# Patient Record
Sex: FEMALE | Race: WHITE | Employment: FULL TIME | URBAN - NONMETROPOLITAN AREA
[De-identification: names, ages, dates, MRNs, and addresses within clinical notes are randomized per-mention and may not be internally consistent; named-entity substitution may affect disease eponyms.]

---

## 2017-02-06 ENCOUNTER — OFFICE VISIT (OUTPATIENT)
Dept: FAMILY MEDICINE CLINIC | Age: 57
End: 2017-02-06
Payer: COMMERCIAL

## 2017-02-06 VITALS
TEMPERATURE: 98.2 F | OXYGEN SATURATION: 98 % | WEIGHT: 130 LBS | RESPIRATION RATE: 16 BRPM | SYSTOLIC BLOOD PRESSURE: 132 MMHG | BODY MASS INDEX: 30.09 KG/M2 | DIASTOLIC BLOOD PRESSURE: 74 MMHG | HEART RATE: 156 BPM | HEIGHT: 55 IN

## 2017-02-06 DIAGNOSIS — Z78.0 MENOPAUSE: ICD-10-CM

## 2017-02-06 DIAGNOSIS — Z12.31 SCREENING MAMMOGRAM, ENCOUNTER FOR: ICD-10-CM

## 2017-02-06 DIAGNOSIS — E55.9 VITAMIN D INSUFFICIENCY: ICD-10-CM

## 2017-02-06 DIAGNOSIS — Z00.00 ANNUAL PHYSICAL EXAM: Primary | ICD-10-CM

## 2017-02-06 DIAGNOSIS — K21.9 GASTROESOPHAGEAL REFLUX DISEASE WITHOUT ESOPHAGITIS: ICD-10-CM

## 2017-02-06 DIAGNOSIS — Z13.9 SCREENING: ICD-10-CM

## 2017-02-06 PROCEDURE — 99396 PREV VISIT EST AGE 40-64: CPT | Performed by: NURSE PRACTITIONER

## 2017-02-06 RX ORDER — OMEPRAZOLE 20 MG/1
20 CAPSULE, DELAYED RELEASE ORAL EVERY MORNING
Qty: 90 CAPSULE | Refills: 3 | Status: SHIPPED | OUTPATIENT
Start: 2017-02-06 | End: 2018-04-23 | Stop reason: SDUPTHER

## 2017-02-06 ASSESSMENT — ENCOUNTER SYMPTOMS
CHANGE IN BOWEL HABIT: 0
SHORTNESS OF BREATH: 0

## 2017-02-16 ENCOUNTER — OFFICE VISIT (OUTPATIENT)
Dept: FAMILY MEDICINE CLINIC | Age: 57
End: 2017-02-16
Payer: COMMERCIAL

## 2017-02-16 VITALS
HEIGHT: 55 IN | HEART RATE: 96 BPM | WEIGHT: 132 LBS | DIASTOLIC BLOOD PRESSURE: 88 MMHG | TEMPERATURE: 99.3 F | SYSTOLIC BLOOD PRESSURE: 134 MMHG | BODY MASS INDEX: 30.55 KG/M2 | OXYGEN SATURATION: 97 %

## 2017-02-16 DIAGNOSIS — J02.9 SORE THROAT: ICD-10-CM

## 2017-02-16 DIAGNOSIS — R52 BODY ACHES: ICD-10-CM

## 2017-02-16 DIAGNOSIS — R50.9 FEVER, UNSPECIFIED FEVER CAUSE: ICD-10-CM

## 2017-02-16 DIAGNOSIS — J10.1 TYPE A INFLUENZA: Primary | ICD-10-CM

## 2017-02-16 LAB
RAPID INFLUENZA  B AGN: NEGATIVE
RAPID INFLUENZA A AGN: POSITIVE
S PYO AG THROAT QL: NEGATIVE

## 2017-02-16 PROCEDURE — 99213 OFFICE O/P EST LOW 20 MIN: CPT | Performed by: NURSE PRACTITIONER

## 2017-02-16 RX ORDER — OSELTAMIVIR PHOSPHATE 75 MG/1
75 CAPSULE ORAL 2 TIMES DAILY
Qty: 10 CAPSULE | Refills: 0 | Status: SHIPPED | OUTPATIENT
Start: 2017-02-16 | End: 2017-02-21

## 2017-02-16 RX ORDER — CALCIUM CARBONATE 500(1250)
500 TABLET ORAL DAILY
COMMUNITY

## 2017-02-16 RX ORDER — GUAIFENESIN AND CODEINE PHOSPHATE 100; 10 MG/5ML; MG/5ML
5 SOLUTION ORAL 4 TIMES DAILY PRN
Qty: 160 ML | Refills: 0 | Status: SHIPPED | OUTPATIENT
Start: 2017-02-16 | End: 2017-02-23

## 2017-02-16 ASSESSMENT — ENCOUNTER SYMPTOMS
COUGH: 1
SORE THROAT: 1
SINUS PRESSURE: 1
SINUS COMPLAINT: 1

## 2017-02-18 LAB — S PYO THROAT QL CULT: NORMAL

## 2017-02-23 DIAGNOSIS — R80.9 PROTEINURIA: ICD-10-CM

## 2017-02-23 DIAGNOSIS — R82.998 CALCIUM OXALATE CRYSTALS IN URINE: ICD-10-CM

## 2017-02-23 DIAGNOSIS — R74.8 ELEVATED LIVER ENZYMES: Primary | ICD-10-CM

## 2017-02-23 DIAGNOSIS — E55.9 VITAMIN D INSUFFICIENCY: ICD-10-CM

## 2017-02-23 DIAGNOSIS — Z00.00 ANNUAL PHYSICAL EXAM: ICD-10-CM

## 2017-02-23 LAB
ALBUMIN SERPL-MCNC: 4.9 G/DL (ref 3.5–5.2)
ALP BLD-CCNC: 76 U/L (ref 35–104)
ALT SERPL-CCNC: 52 U/L (ref 5–33)
AMORPHOUS: ABNORMAL /HPF
ANION GAP SERPL CALCULATED.3IONS-SCNC: 18 MMOL/L (ref 7–19)
AST SERPL-CCNC: 35 U/L (ref 5–32)
BACTERIA: ABNORMAL /HPF
BASOPHILS ABSOLUTE: 0 K/UL (ref 0–0.2)
BASOPHILS RELATIVE PERCENT: 0.4 % (ref 0–1)
BILIRUB SERPL-MCNC: 0.7 MG/DL (ref 0.2–1.2)
BILIRUBIN URINE: NEGATIVE
BLOOD, URINE: NEGATIVE
BUN BLDV-MCNC: 10 MG/DL (ref 6–20)
CALCIUM SERPL-MCNC: 10.1 MG/DL (ref 8.6–10)
CHLORIDE BLD-SCNC: 101 MMOL/L (ref 98–111)
CHOLESTEROL, TOTAL: 212 MG/DL (ref 160–199)
CLARITY: ABNORMAL
CO2: 23 MMOL/L (ref 22–29)
COLOR: ABNORMAL
CREAT SERPL-MCNC: 0.8 MG/DL (ref 0.5–0.9)
CRYSTALS, UA: ABNORMAL /HPF
EOSINOPHILS ABSOLUTE: 0.2 K/UL (ref 0–0.6)
EOSINOPHILS RELATIVE PERCENT: 3.5 % (ref 0–5)
EPITHELIAL CELLS, UA: ABNORMAL /HPF
GFR NON-AFRICAN AMERICAN: >60
GLOBULIN: 2.8 G/DL
GLUCOSE BLD-MCNC: 98 MG/DL (ref 74–109)
GLUCOSE URINE: NEGATIVE MG/DL
HCT VFR BLD CALC: 41.1 % (ref 37–47)
HDLC SERPL-MCNC: 36 MG/DL (ref 65–121)
HEMOGLOBIN: 14.1 G/DL (ref 12–16)
KETONES, URINE: NEGATIVE MG/DL
LDL CHOLESTEROL CALCULATED: 136 MG/DL
LEUKOCYTE ESTERASE, URINE: ABNORMAL
LYMPHOCYTES ABSOLUTE: 1.9 K/UL (ref 1.1–4.5)
LYMPHOCYTES RELATIVE PERCENT: 41.8 % (ref 20–40)
MCH RBC QN AUTO: 31.4 PG (ref 27–31)
MCHC RBC AUTO-ENTMCNC: 34.3 G/DL (ref 33–37)
MCV RBC AUTO: 91.5 FL (ref 81–99)
MONOCYTES ABSOLUTE: 0.5 K/UL (ref 0–0.9)
MONOCYTES RELATIVE PERCENT: 10.7 % (ref 0–10)
NEUTROPHILS ABSOLUTE: 2 K/UL (ref 1.5–7.5)
NEUTROPHILS RELATIVE PERCENT: 43.6 % (ref 50–65)
NITRITE, URINE: NEGATIVE
PDW BLD-RTO: 12.1 % (ref 11.5–14.5)
PH UA: 5.5
PLATELET # BLD: 278 K/UL (ref 130–400)
PMV BLD AUTO: 9.6 FL (ref 7.4–10.4)
POTASSIUM SERPL-SCNC: 4.3 MMOL/L (ref 3.5–5)
PROTEIN UA: 30 MG/DL
RBC # BLD: 4.49 M/UL (ref 4.2–5.4)
RBC UA: ABNORMAL /HPF (ref 0–2)
SODIUM BLD-SCNC: 142 MMOL/L (ref 136–145)
SPECIFIC GRAVITY UA: 1.02
TOTAL PROTEIN: 7.7 G/DL (ref 6.6–8.7)
TRIGL SERPL-MCNC: 201 MG/DL (ref 150–199)
TSH SERPL DL<=0.05 MIU/L-ACNC: 3.97 UIU/ML (ref 0.27–4.2)
UROBILINOGEN, URINE: 0.2 E.U./DL
VITAMIN D 25-HYDROXY: 37.4 NG/ML
WBC # BLD: 4.6 K/UL (ref 4.8–10.8)
WBC UA: ABNORMAL /HPF (ref 0–5)

## 2017-02-25 LAB — URINE CULTURE, ROUTINE: NORMAL

## 2017-03-08 ENCOUNTER — HOSPITAL ENCOUNTER (OUTPATIENT)
Dept: GENERAL RADIOLOGY | Age: 57
Discharge: HOME | End: 2017-03-08
Payer: COMMERCIAL

## 2017-03-08 DIAGNOSIS — R80.9 PROTEINURIA: ICD-10-CM

## 2017-03-08 DIAGNOSIS — R74.8 ELEVATED LIVER ENZYMES: ICD-10-CM

## 2017-03-08 DIAGNOSIS — R82.998 CALCIUM OXALATE CRYSTALS IN URINE: ICD-10-CM

## 2017-03-08 LAB
ALBUMIN SERPL-MCNC: 4.8 G/DL (ref 3.5–5.2)
ALP BLD-CCNC: 74 U/L (ref 35–104)
ALT SERPL-CCNC: 27 U/L (ref 5–33)
AST SERPL-CCNC: 25 U/L (ref 5–32)
BACTERIA: NORMAL /HPF
BILIRUB SERPL-MCNC: 0.5 MG/DL (ref 0.2–1.2)
BILIRUBIN DIRECT: 0.1 MG/DL (ref 0–0.3)
BILIRUBIN URINE: NEGATIVE
BILIRUBIN, INDIRECT: 0.4 MG/DL (ref 0.1–1)
BLOOD, URINE: NEGATIVE
CLARITY: CLEAR
COLOR: YELLOW
EPITHELIAL CELLS, UA: 6 /HPF (ref 0–5)
GLUCOSE URINE: NEGATIVE MG/DL
HAV IGM SER IA-ACNC: NORMAL
HEPATITIS B CORE IGM ANTIBODY: NORMAL
HEPATITIS B SURFACE ANTIGEN INTERPRETATION: NORMAL
HEPATITIS C ANTIBODY INTERPRETATION: NORMAL
HYALINE CASTS: 4 /HPF (ref 0–8)
KETONES, URINE: NEGATIVE MG/DL
LEUKOCYTE ESTERASE, URINE: ABNORMAL
NITRITE, URINE: NEGATIVE
PH UA: 5.5
PROTEIN UA: NEGATIVE MG/DL
RBC UA: 1 /HPF (ref 0–4)
SPECIFIC GRAVITY UA: 1.01
TOTAL PROTEIN: 7.6 G/DL (ref 6.6–8.7)
UROBILINOGEN, URINE: 0.2 E.U./DL
WBC UA: 5 /HPF (ref 0–5)

## 2017-03-08 PROCEDURE — 74000 XR ABDOMEN LIMITED (KUB): CPT

## 2017-03-10 LAB — URINE CULTURE, ROUTINE: NORMAL

## 2017-03-15 ENCOUNTER — HOSPITAL ENCOUNTER (OUTPATIENT)
Dept: WOMENS IMAGING | Age: 57
Discharge: HOME | End: 2017-03-15
Payer: COMMERCIAL

## 2017-03-15 DIAGNOSIS — Z78.0 MENOPAUSE: ICD-10-CM

## 2017-03-15 PROCEDURE — 77080 DXA BONE DENSITY AXIAL: CPT

## 2017-03-27 RX ORDER — ALENDRONATE SODIUM 70 MG/1
70 TABLET ORAL
Qty: 4 TABLET | Refills: 11 | Status: SHIPPED
Start: 2017-03-27 | End: 2020-03-11

## 2017-05-03 ENCOUNTER — OFFICE VISIT (OUTPATIENT)
Dept: GASTROENTEROLOGY | Facility: CLINIC | Age: 57
End: 2017-05-03

## 2017-05-03 VITALS
HEIGHT: 59 IN | DIASTOLIC BLOOD PRESSURE: 68 MMHG | BODY MASS INDEX: 25.56 KG/M2 | OXYGEN SATURATION: 98 % | WEIGHT: 126.8 LBS | HEART RATE: 74 BPM | SYSTOLIC BLOOD PRESSURE: 124 MMHG

## 2017-05-03 DIAGNOSIS — K63.5 COLON POLYPS: Primary | ICD-10-CM

## 2017-05-03 PROCEDURE — 99213 OFFICE O/P EST LOW 20 MIN: CPT | Performed by: NURSE PRACTITIONER

## 2017-05-03 RX ORDER — CHLORAL HYDRATE 500 MG
CAPSULE ORAL
Status: ON HOLD | COMMUNITY
End: 2022-06-14

## 2017-05-03 RX ORDER — OMEPRAZOLE 20 MG/1
CAPSULE, DELAYED RELEASE ORAL
COMMUNITY
Start: 2017-02-06

## 2017-05-03 RX ORDER — M-VIT,TX,IRON,MINS/CALC/FOLIC 27MG-0.4MG
TABLET ORAL
COMMUNITY

## 2017-05-03 RX ORDER — ALENDRONATE SODIUM 70 MG/1
TABLET ORAL
Status: ON HOLD | COMMUNITY
Start: 2017-03-27 | End: 2022-06-14

## 2017-05-24 ENCOUNTER — NURSE ONLY (OUTPATIENT)
Dept: FAMILY MEDICINE CLINIC | Age: 57
End: 2017-05-24
Payer: COMMERCIAL

## 2017-05-24 DIAGNOSIS — Z11.1 VISIT FOR TB SKIN TEST: Primary | ICD-10-CM

## 2017-05-24 PROCEDURE — 86580 TB INTRADERMAL TEST: CPT | Performed by: FAMILY MEDICINE

## 2017-05-25 ENCOUNTER — ANESTHESIA EVENT (OUTPATIENT)
Dept: GASTROENTEROLOGY | Facility: HOSPITAL | Age: 57
End: 2017-05-25

## 2017-05-26 ENCOUNTER — TELEPHONE (OUTPATIENT)
Dept: GASTROENTEROLOGY | Facility: CLINIC | Age: 57
End: 2017-05-26

## 2017-05-26 ENCOUNTER — ANESTHESIA (OUTPATIENT)
Dept: GASTROENTEROLOGY | Facility: HOSPITAL | Age: 57
End: 2017-05-26

## 2017-05-26 ENCOUNTER — HOSPITAL ENCOUNTER (OUTPATIENT)
Facility: HOSPITAL | Age: 57
Setting detail: HOSPITAL OUTPATIENT SURGERY
Discharge: HOME | End: 2017-05-26
Attending: INTERNAL MEDICINE | Admitting: INTERNAL MEDICINE

## 2017-05-26 VITALS
RESPIRATION RATE: 19 BRPM | HEIGHT: 59 IN | BODY MASS INDEX: 25.8 KG/M2 | SYSTOLIC BLOOD PRESSURE: 93 MMHG | DIASTOLIC BLOOD PRESSURE: 67 MMHG | HEART RATE: 64 BPM | TEMPERATURE: 98.1 F | OXYGEN SATURATION: 97 % | WEIGHT: 128 LBS

## 2017-05-26 DIAGNOSIS — K63.5 COLON POLYPS: ICD-10-CM

## 2017-05-26 PROCEDURE — 45380 COLONOSCOPY AND BIOPSY: CPT | Performed by: INTERNAL MEDICINE

## 2017-05-26 PROCEDURE — 25010000002 PROPOFOL 10 MG/ML EMULSION: Performed by: NURSE ANESTHETIST, CERTIFIED REGISTERED

## 2017-05-26 PROCEDURE — 45385 COLONOSCOPY W/LESION REMOVAL: CPT | Performed by: INTERNAL MEDICINE

## 2017-05-26 PROCEDURE — 88305 TISSUE EXAM BY PATHOLOGIST: CPT | Performed by: INTERNAL MEDICINE

## 2017-05-26 RX ORDER — SODIUM CHLORIDE 9 MG/ML
100 INJECTION, SOLUTION INTRAVENOUS CONTINUOUS
Status: DISCONTINUED | OUTPATIENT
Start: 2017-05-26 | End: 2017-05-26 | Stop reason: HOSPADM

## 2017-05-26 RX ORDER — SODIUM CHLORIDE 0.9 % (FLUSH) 0.9 %
1-10 SYRINGE (ML) INJECTION AS NEEDED
Status: DISCONTINUED | OUTPATIENT
Start: 2017-05-26 | End: 2017-05-26 | Stop reason: HOSPADM

## 2017-05-26 RX ORDER — PROPOFOL 10 MG/ML
VIAL (ML) INTRAVENOUS AS NEEDED
Status: DISCONTINUED | OUTPATIENT
Start: 2017-05-26 | End: 2017-05-26 | Stop reason: SURG

## 2017-05-26 RX ADMIN — PROPOFOL 50 MG: 10 INJECTION, EMULSION INTRAVENOUS at 07:50

## 2017-05-26 RX ADMIN — PROPOFOL 30 MG: 10 INJECTION, EMULSION INTRAVENOUS at 07:57

## 2017-05-26 RX ADMIN — PROPOFOL 30 MG: 10 INJECTION, EMULSION INTRAVENOUS at 08:01

## 2017-05-26 RX ADMIN — LIDOCAINE HYDROCHLORIDE 0.5 ML: 10 INJECTION, SOLUTION EPIDURAL; INFILTRATION; INTRACAUDAL; PERINEURAL at 06:46

## 2017-05-26 RX ADMIN — PROPOFOL 30 MG: 10 INJECTION, EMULSION INTRAVENOUS at 07:53

## 2017-05-26 RX ADMIN — PROPOFOL 30 MG: 10 INJECTION, EMULSION INTRAVENOUS at 07:51

## 2017-05-26 RX ADMIN — PROPOFOL 30 MG: 10 INJECTION, EMULSION INTRAVENOUS at 07:52

## 2017-05-26 RX ADMIN — PROPOFOL 50 MG: 10 INJECTION, EMULSION INTRAVENOUS at 07:49

## 2017-05-26 RX ADMIN — PROPOFOL 30 MG: 10 INJECTION, EMULSION INTRAVENOUS at 07:55

## 2017-05-26 RX ADMIN — SODIUM CHLORIDE 100 ML/HR: 9 INJECTION, SOLUTION INTRAVENOUS at 06:46

## 2017-05-26 RX ADMIN — PROPOFOL 30 MG: 10 INJECTION, EMULSION INTRAVENOUS at 07:59

## 2017-05-30 LAB
CYTO UR: NORMAL
LAB AP CASE REPORT: NORMAL
LAB AP CLINICAL INFORMATION: NORMAL
Lab: NORMAL
PATH REPORT.FINAL DX SPEC: NORMAL
PATH REPORT.GROSS SPEC: NORMAL

## 2018-01-31 ENCOUNTER — OFFICE VISIT (OUTPATIENT)
Dept: FAMILY MEDICINE CLINIC | Age: 58
End: 2018-01-31
Payer: COMMERCIAL

## 2018-01-31 VITALS
HEART RATE: 83 BPM | RESPIRATION RATE: 16 BRPM | DIASTOLIC BLOOD PRESSURE: 74 MMHG | SYSTOLIC BLOOD PRESSURE: 110 MMHG | WEIGHT: 125 LBS | TEMPERATURE: 98.2 F | HEIGHT: 59 IN | BODY MASS INDEX: 25.2 KG/M2 | OXYGEN SATURATION: 98 %

## 2018-01-31 DIAGNOSIS — J02.9 SORE THROAT: ICD-10-CM

## 2018-01-31 DIAGNOSIS — J06.9 ACUTE URI: Primary | ICD-10-CM

## 2018-01-31 DIAGNOSIS — R50.9 FEVER, UNSPECIFIED FEVER CAUSE: ICD-10-CM

## 2018-01-31 LAB
INFLUENZA A ANTIBODY: NORMAL
INFLUENZA B ANTIBODY: NORMAL
S PYO AG THROAT QL: NORMAL

## 2018-01-31 PROCEDURE — 87804 INFLUENZA ASSAY W/OPTIC: CPT | Performed by: NURSE PRACTITIONER

## 2018-01-31 PROCEDURE — 87880 STREP A ASSAY W/OPTIC: CPT | Performed by: NURSE PRACTITIONER

## 2018-01-31 PROCEDURE — 99213 OFFICE O/P EST LOW 20 MIN: CPT | Performed by: NURSE PRACTITIONER

## 2018-01-31 RX ORDER — DOXYCYCLINE HYCLATE 100 MG
100 TABLET ORAL 2 TIMES DAILY
Qty: 20 TABLET | Refills: 0 | Status: SHIPPED | OUTPATIENT
Start: 2018-01-31 | End: 2018-02-10

## 2018-01-31 RX ORDER — BENZONATATE 200 MG/1
200 CAPSULE ORAL 3 TIMES DAILY PRN
Qty: 21 CAPSULE | Refills: 0 | Status: SHIPPED | OUTPATIENT
Start: 2018-01-31 | End: 2018-02-07

## 2018-01-31 ASSESSMENT — ENCOUNTER SYMPTOMS
CONSTIPATION: 0
SINUS PAIN: 1
DIARRHEA: 0
VOICE CHANGE: 1
SINUS PRESSURE: 1
COUGH: 1
SORE THROAT: 1

## 2018-04-23 ENCOUNTER — OFFICE VISIT (OUTPATIENT)
Dept: FAMILY MEDICINE CLINIC | Age: 58
End: 2018-04-23
Payer: COMMERCIAL

## 2018-04-23 VITALS
HEIGHT: 59 IN | DIASTOLIC BLOOD PRESSURE: 80 MMHG | TEMPERATURE: 97.7 F | SYSTOLIC BLOOD PRESSURE: 112 MMHG | HEART RATE: 62 BPM | BODY MASS INDEX: 25.6 KG/M2 | OXYGEN SATURATION: 98 % | RESPIRATION RATE: 16 BRPM | WEIGHT: 127 LBS

## 2018-04-23 DIAGNOSIS — M79.602 MUSCULOSKELETAL ARM PAIN, LEFT: ICD-10-CM

## 2018-04-23 DIAGNOSIS — Z00.00 ANNUAL PHYSICAL EXAM: Primary | ICD-10-CM

## 2018-04-23 DIAGNOSIS — Z12.31 SCREENING MAMMOGRAM, ENCOUNTER FOR: ICD-10-CM

## 2018-04-23 DIAGNOSIS — M81.0 OSTEOPOROSIS, UNSPECIFIED OSTEOPOROSIS TYPE, UNSPECIFIED PATHOLOGICAL FRACTURE PRESENCE: ICD-10-CM

## 2018-04-23 DIAGNOSIS — K21.9 GASTROESOPHAGEAL REFLUX DISEASE WITHOUT ESOPHAGITIS: ICD-10-CM

## 2018-04-23 DIAGNOSIS — E78.2 MIXED HYPERLIPIDEMIA: ICD-10-CM

## 2018-04-23 LAB
ALBUMIN SERPL-MCNC: 4.8 G/DL (ref 3.5–5.2)
ALP BLD-CCNC: 65 U/L (ref 35–104)
ALT SERPL-CCNC: 29 U/L (ref 5–33)
ANION GAP SERPL CALCULATED.3IONS-SCNC: 14 MMOL/L (ref 7–19)
AST SERPL-CCNC: 20 U/L (ref 5–32)
BACTERIA: NEGATIVE /HPF
BASOPHILS ABSOLUTE: 0 K/UL (ref 0–0.2)
BASOPHILS RELATIVE PERCENT: 0.7 % (ref 0–1)
BILIRUB SERPL-MCNC: 0.3 MG/DL (ref 0.2–1.2)
BILIRUBIN URINE: NEGATIVE
BLOOD, URINE: NEGATIVE
BUN BLDV-MCNC: 13 MG/DL (ref 6–20)
CALCIUM SERPL-MCNC: 9.2 MG/DL (ref 8.6–10)
CHLORIDE BLD-SCNC: 102 MMOL/L (ref 98–111)
CHOLESTEROL, TOTAL: 227 MG/DL (ref 160–199)
CLARITY: CLEAR
CO2: 26 MMOL/L (ref 22–29)
COLOR: ABNORMAL
CREAT SERPL-MCNC: 0.7 MG/DL (ref 0.5–0.9)
EOSINOPHILS ABSOLUTE: 0.3 K/UL (ref 0–0.6)
EOSINOPHILS RELATIVE PERCENT: 5.2 % (ref 0–5)
EPITHELIAL CELLS, UA: 5 /HPF (ref 0–5)
GFR NON-AFRICAN AMERICAN: >60
GLUCOSE BLD-MCNC: 94 MG/DL (ref 74–109)
GLUCOSE URINE: NEGATIVE MG/DL
HCT VFR BLD CALC: 43.5 % (ref 37–47)
HDLC SERPL-MCNC: 54 MG/DL (ref 65–121)
HEMOGLOBIN: 13.9 G/DL (ref 12–16)
HYALINE CASTS: 5 /HPF (ref 0–8)
KETONES, URINE: NEGATIVE MG/DL
LDL CHOLESTEROL CALCULATED: 150 MG/DL
LEUKOCYTE ESTERASE, URINE: ABNORMAL
LYMPHOCYTES ABSOLUTE: 1.7 K/UL (ref 1.1–4.5)
LYMPHOCYTES RELATIVE PERCENT: 30.6 % (ref 20–40)
MCH RBC QN AUTO: 29.1 PG (ref 27–31)
MCHC RBC AUTO-ENTMCNC: 32 G/DL (ref 33–37)
MCV RBC AUTO: 91.2 FL (ref 81–99)
MONOCYTES ABSOLUTE: 0.3 K/UL (ref 0–0.9)
MONOCYTES RELATIVE PERCENT: 5.7 % (ref 0–10)
NEUTROPHILS ABSOLUTE: 3.2 K/UL (ref 1.5–7.5)
NEUTROPHILS RELATIVE PERCENT: 57.4 % (ref 50–65)
NITRITE, URINE: NEGATIVE
PDW BLD-RTO: 13.5 % (ref 11.5–14.5)
PH UA: 5.5
PLATELET # BLD: 200 K/UL (ref 130–400)
PMV BLD AUTO: 9.9 FL (ref 9.4–12.3)
POTASSIUM SERPL-SCNC: 4.2 MMOL/L (ref 3.5–5)
PROTEIN UA: NEGATIVE MG/DL
RBC # BLD: 4.77 M/UL (ref 4.2–5.4)
RBC UA: 2 /HPF (ref 0–4)
SODIUM BLD-SCNC: 142 MMOL/L (ref 136–145)
SPECIFIC GRAVITY UA: 1.02
T4 FREE: 1.3 NG/DL (ref 0.9–1.7)
TOTAL PROTEIN: 7.5 G/DL (ref 6.6–8.7)
TRIGL SERPL-MCNC: 115 MG/DL (ref 0–149)
TSH SERPL DL<=0.05 MIU/L-ACNC: 1.77 UIU/ML (ref 0.27–4.2)
URINE REFLEX TO CULTURE: YES
UROBILINOGEN, URINE: 0.2 E.U./DL
VITAMIN D 25-HYDROXY: 36.3 NG/ML
WBC # BLD: 5.6 K/UL (ref 4.8–10.8)
WBC UA: 6 /HPF (ref 0–5)

## 2018-04-23 PROCEDURE — 99396 PREV VISIT EST AGE 40-64: CPT | Performed by: NURSE PRACTITIONER

## 2018-04-23 RX ORDER — OMEPRAZOLE 20 MG/1
20 CAPSULE, DELAYED RELEASE ORAL EVERY MORNING
Qty: 90 CAPSULE | Refills: 3 | Status: SHIPPED | OUTPATIENT
Start: 2018-04-23 | End: 2019-07-30 | Stop reason: SDUPTHER

## 2018-04-23 ASSESSMENT — ENCOUNTER SYMPTOMS
TROUBLE SWALLOWING: 0
CONSTIPATION: 0
SHORTNESS OF BREATH: 0
DIARRHEA: 0

## 2018-04-23 ASSESSMENT — PATIENT HEALTH QUESTIONNAIRE - PHQ9
1. LITTLE INTEREST OR PLEASURE IN DOING THINGS: 0
SUM OF ALL RESPONSES TO PHQ QUESTIONS 1-9: 0
2. FEELING DOWN, DEPRESSED OR HOPELESS: 0
SUM OF ALL RESPONSES TO PHQ9 QUESTIONS 1 & 2: 0

## 2018-04-25 LAB — URINE CULTURE, ROUTINE: NORMAL

## 2018-04-26 ENCOUNTER — TELEPHONE (OUTPATIENT)
Dept: FAMILY MEDICINE CLINIC | Age: 58
End: 2018-04-26

## 2018-04-26 DIAGNOSIS — M79.632 LEFT FOREARM PAIN: Primary | ICD-10-CM

## 2018-04-26 DIAGNOSIS — R20.2 PARESTHESIA: ICD-10-CM

## 2018-05-15 ENCOUNTER — HOSPITAL ENCOUNTER (OUTPATIENT)
Dept: NEUROLOGY | Age: 58
Discharge: HOME | End: 2018-05-15
Payer: COMMERCIAL

## 2018-05-15 DIAGNOSIS — M79.632 LEFT FOREARM PAIN: ICD-10-CM

## 2018-05-15 DIAGNOSIS — R20.2 PARESTHESIA: ICD-10-CM

## 2018-05-15 PROCEDURE — 95909 NRV CNDJ TST 5-6 STUDIES: CPT | Performed by: PSYCHIATRY & NEUROLOGY

## 2018-05-15 PROCEDURE — 95886 MUSC TEST DONE W/N TEST COMP: CPT | Performed by: PSYCHIATRY & NEUROLOGY

## 2018-05-15 PROCEDURE — 95886 MUSC TEST DONE W/N TEST COMP: CPT

## 2018-05-15 PROCEDURE — 95909 NRV CNDJ TST 5-6 STUDIES: CPT

## 2018-05-17 DIAGNOSIS — M79.632 LEFT FOREARM PAIN: Primary | ICD-10-CM

## 2019-07-30 DIAGNOSIS — K21.9 GASTROESOPHAGEAL REFLUX DISEASE WITHOUT ESOPHAGITIS: ICD-10-CM

## 2019-07-30 RX ORDER — OMEPRAZOLE 20 MG/1
20 CAPSULE, DELAYED RELEASE ORAL EVERY MORNING
Qty: 90 CAPSULE | Refills: 0 | Status: SHIPPED | OUTPATIENT
Start: 2019-07-30 | End: 2019-11-07 | Stop reason: SDUPTHER

## 2019-07-31 ENCOUNTER — NURSE ONLY (OUTPATIENT)
Dept: FAMILY MEDICINE CLINIC | Age: 59
End: 2019-07-31
Payer: COMMERCIAL

## 2019-07-31 DIAGNOSIS — Z11.1 VISIT FOR TB SKIN TEST: Primary | ICD-10-CM

## 2019-07-31 PROCEDURE — 86580 TB INTRADERMAL TEST: CPT | Performed by: FAMILY MEDICINE

## 2019-08-05 ENCOUNTER — NURSE ONLY (OUTPATIENT)
Dept: FAMILY MEDICINE CLINIC | Age: 59
End: 2019-08-05
Payer: COMMERCIAL

## 2019-08-05 DIAGNOSIS — Z23 NEED FOR TUBERCULOSIS VACCINATION: Primary | ICD-10-CM

## 2019-08-05 PROCEDURE — 86580 TB INTRADERMAL TEST: CPT | Performed by: FAMILY MEDICINE

## 2019-08-07 ENCOUNTER — NURSE ONLY (OUTPATIENT)
Dept: FAMILY MEDICINE CLINIC | Age: 59
End: 2019-08-07

## 2019-08-07 DIAGNOSIS — Z11.1 ENCOUNTER FOR PPD SKIN TEST READING: Primary | ICD-10-CM

## 2019-08-19 ENCOUNTER — OFFICE VISIT (OUTPATIENT)
Dept: FAMILY MEDICINE CLINIC | Age: 59
End: 2019-08-19
Payer: COMMERCIAL

## 2019-08-19 VITALS
DIASTOLIC BLOOD PRESSURE: 64 MMHG | HEIGHT: 59 IN | HEART RATE: 68 BPM | OXYGEN SATURATION: 94 % | SYSTOLIC BLOOD PRESSURE: 122 MMHG | WEIGHT: 128.6 LBS | TEMPERATURE: 98 F | BODY MASS INDEX: 25.92 KG/M2

## 2019-08-19 DIAGNOSIS — J01.90 ACUTE NON-RECURRENT SINUSITIS, UNSPECIFIED LOCATION: ICD-10-CM

## 2019-08-19 DIAGNOSIS — J02.9 ACUTE PHARYNGITIS, UNSPECIFIED ETIOLOGY: Primary | ICD-10-CM

## 2019-08-19 LAB — S PYO AG THROAT QL: NORMAL

## 2019-08-19 PROCEDURE — 99213 OFFICE O/P EST LOW 20 MIN: CPT | Performed by: NURSE PRACTITIONER

## 2019-08-19 PROCEDURE — 87880 STREP A ASSAY W/OPTIC: CPT | Performed by: NURSE PRACTITIONER

## 2019-08-19 RX ORDER — PREDNISONE 20 MG/1
20 TABLET ORAL DAILY
Qty: 7 TABLET | Refills: 0 | Status: SHIPPED | OUTPATIENT
Start: 2019-08-19 | End: 2019-08-26

## 2019-08-19 RX ORDER — DOXYCYCLINE HYCLATE 100 MG
100 TABLET ORAL 2 TIMES DAILY
Qty: 20 TABLET | Refills: 0 | Status: SHIPPED | OUTPATIENT
Start: 2019-08-19 | End: 2019-08-29

## 2019-08-19 ASSESSMENT — PATIENT HEALTH QUESTIONNAIRE - PHQ9
SUM OF ALL RESPONSES TO PHQ QUESTIONS 1-9: 0
SUM OF ALL RESPONSES TO PHQ9 QUESTIONS 1 & 2: 0
SUM OF ALL RESPONSES TO PHQ QUESTIONS 1-9: 0
1. LITTLE INTEREST OR PLEASURE IN DOING THINGS: 0
2. FEELING DOWN, DEPRESSED OR HOPELESS: 0

## 2019-08-19 ASSESSMENT — ENCOUNTER SYMPTOMS
SORE THROAT: 1
SINUS PRESSURE: 1
COUGH: 1

## 2019-08-19 NOTE — PROGRESS NOTES
t        SUBJECTIVE:    Patient ID: Gigi Albert is a59 y.o. female. Gigi Albert is here today for Pharyngitis; Facial Pain (pressure); and Cough (from drainage)  . HPI:   HPI       Patient states she is here today for sore throat and facial pressure and cough. Patient states that approximately 2 days ago symptoms started with sore throat and progressed to facial pain and coughing to which she feels is from draining down her throat. Patient does state that she has restarted allergy injections but has only had one series. Patient has been off of allergy injections for several years and states that she may never go off of them again. Patient does have significant mold allergy. No fever reported. Patient has taken over-the-counter Tylenol or ibuprofen this morning. Office Visit on 08/19/2019   Component Date Value Ref Range Status    Strep A Ag 08/19/2019 None Detected  None Detected Final             History reviewed. No pertinent past medical history. Prior to Visit Medications    Medication Sig Taking? Authorizing Provider   doxycycline hyclate (VIBRA-TABS) 100 MG tablet Take 1 tablet by mouth 2 times daily for 10 days Yes CASI Knox   predniSONE (DELTASONE) 20 MG tablet Take 1 tablet by mouth daily for 7 days Yes CASI Knox   omeprazole (PRILOSEC) 20 MG delayed release capsule Take 1 capsule by mouth every morning Yes CASI Knox   alendronate (FOSAMAX) 70 MG tablet Take 1 tablet by mouth every 7 days In am on empty stomach with 8 oz of water, remain upright X 30 minutes Yes CASI Knox   calcium carbonate (OSCAL) 500 MG TABS tablet Take 500 mg by mouth daily Yes Historical Provider, MD   Multiple Vitamins-Minerals (THERAPEUTIC MULTIVITAMIN-MINERALS) tablet Take 1 tablet by mouth daily.  Yes Historical Provider, MD     Allergies   Allergen Reactions    Erythromycin Anaphylaxis    Pcn [Penicillins] Anaphylaxis    Sulfa Antibiotics Anaphylaxis     Past Surgical History: worse.    Diagnosis and orders for this visit are above. Please note that this chart was generated using dragon dictationsoftware. Although every effort was made to ensure the accuracy of this automated transcription, some errors in transcription may have occurred.

## 2019-11-07 DIAGNOSIS — K21.9 GASTROESOPHAGEAL REFLUX DISEASE WITHOUT ESOPHAGITIS: ICD-10-CM

## 2019-11-07 RX ORDER — OMEPRAZOLE 20 MG/1
20 CAPSULE, DELAYED RELEASE ORAL EVERY MORNING
Qty: 90 CAPSULE | Refills: 0 | Status: SHIPPED | OUTPATIENT
Start: 2019-11-07 | End: 2020-03-11 | Stop reason: SDUPTHER

## 2020-03-11 ENCOUNTER — OFFICE VISIT (OUTPATIENT)
Dept: FAMILY MEDICINE CLINIC | Age: 60
End: 2020-03-11
Payer: COMMERCIAL

## 2020-03-11 VITALS
TEMPERATURE: 97.9 F | DIASTOLIC BLOOD PRESSURE: 80 MMHG | OXYGEN SATURATION: 96 % | WEIGHT: 129 LBS | HEART RATE: 89 BPM | RESPIRATION RATE: 20 BRPM | HEIGHT: 59 IN | SYSTOLIC BLOOD PRESSURE: 110 MMHG | BODY MASS INDEX: 26 KG/M2

## 2020-03-11 DIAGNOSIS — K21.9 GASTROESOPHAGEAL REFLUX DISEASE WITHOUT ESOPHAGITIS: ICD-10-CM

## 2020-03-11 DIAGNOSIS — Z00.00 ANNUAL PHYSICAL EXAM: ICD-10-CM

## 2020-03-11 DIAGNOSIS — M81.0 OSTEOPOROSIS, UNSPECIFIED OSTEOPOROSIS TYPE, UNSPECIFIED PATHOLOGICAL FRACTURE PRESENCE: ICD-10-CM

## 2020-03-11 LAB
ALBUMIN SERPL-MCNC: 5 G/DL (ref 3.5–5.2)
ALP BLD-CCNC: 71 U/L (ref 35–104)
ALT SERPL-CCNC: 34 U/L (ref 5–33)
ANION GAP SERPL CALCULATED.3IONS-SCNC: 15 MMOL/L (ref 7–19)
AST SERPL-CCNC: 23 U/L (ref 5–32)
BACTERIA: NEGATIVE /HPF
BASOPHILS ABSOLUTE: 0.1 K/UL (ref 0–0.2)
BASOPHILS RELATIVE PERCENT: 1 % (ref 0–1)
BILIRUB SERPL-MCNC: 0.5 MG/DL (ref 0.2–1.2)
BILIRUBIN URINE: NEGATIVE
BLOOD, URINE: NEGATIVE
BUN BLDV-MCNC: 17 MG/DL (ref 8–23)
CALCIUM SERPL-MCNC: 9.5 MG/DL (ref 8.8–10.2)
CHLORIDE BLD-SCNC: 101 MMOL/L (ref 98–111)
CHOLESTEROL, TOTAL: 190 MG/DL (ref 160–199)
CLARITY: CLEAR
CO2: 26 MMOL/L (ref 22–29)
COLOR: YELLOW
CREAT SERPL-MCNC: 0.7 MG/DL (ref 0.5–0.9)
EOSINOPHILS ABSOLUTE: 0.2 K/UL (ref 0–0.6)
EOSINOPHILS RELATIVE PERCENT: 3.3 % (ref 0–5)
EPITHELIAL CELLS, UA: 1 /HPF (ref 0–5)
GFR NON-AFRICAN AMERICAN: >60
GLUCOSE BLD-MCNC: 96 MG/DL (ref 74–109)
GLUCOSE URINE: NEGATIVE MG/DL
HCT VFR BLD CALC: 41.8 % (ref 37–47)
HDLC SERPL-MCNC: 49 MG/DL (ref 65–121)
HEMOGLOBIN: 13.9 G/DL (ref 12–16)
HYALINE CASTS: 1 /HPF (ref 0–8)
IMMATURE GRANULOCYTES #: 0 K/UL
KETONES, URINE: NEGATIVE MG/DL
LDL CHOLESTEROL CALCULATED: 124 MG/DL
LEUKOCYTE ESTERASE, URINE: ABNORMAL
LYMPHOCYTES ABSOLUTE: 1.6 K/UL (ref 1.1–4.5)
LYMPHOCYTES RELATIVE PERCENT: 31.5 % (ref 20–40)
MCH RBC QN AUTO: 30.5 PG (ref 27–31)
MCHC RBC AUTO-ENTMCNC: 33.3 G/DL (ref 33–37)
MCV RBC AUTO: 91.7 FL (ref 81–99)
MONOCYTES ABSOLUTE: 0.4 K/UL (ref 0–0.9)
MONOCYTES RELATIVE PERCENT: 7.3 % (ref 0–10)
NEUTROPHILS ABSOLUTE: 3 K/UL (ref 1.5–7.5)
NEUTROPHILS RELATIVE PERCENT: 56.7 % (ref 50–65)
NITRITE, URINE: NEGATIVE
PDW BLD-RTO: 12.4 % (ref 11.5–14.5)
PH UA: 6.5 (ref 5–8)
PLATELET # BLD: 214 K/UL (ref 130–400)
PMV BLD AUTO: 10 FL (ref 9.4–12.3)
POTASSIUM REFLEX MAGNESIUM: 4.2 MMOL/L (ref 3.5–5)
PROTEIN UA: NEGATIVE MG/DL
RBC # BLD: 4.56 M/UL (ref 4.2–5.4)
RBC UA: 1 /HPF (ref 0–4)
SODIUM BLD-SCNC: 142 MMOL/L (ref 136–145)
SPECIFIC GRAVITY UA: 1.01 (ref 1–1.03)
T4 FREE: 1.33 NG/DL (ref 0.93–1.7)
TOTAL PROTEIN: 7.7 G/DL (ref 6.6–8.7)
TRIGL SERPL-MCNC: 84 MG/DL (ref 0–149)
TSH SERPL DL<=0.05 MIU/L-ACNC: 1.35 UIU/ML (ref 0.27–4.2)
URINE REFLEX TO CULTURE: YES
UROBILINOGEN, URINE: 0.2 E.U./DL
VITAMIN D 25-HYDROXY: 33.7 NG/ML
WBC # BLD: 5.2 K/UL (ref 4.8–10.8)
WBC UA: 4 /HPF (ref 0–5)

## 2020-03-11 PROCEDURE — 90686 IIV4 VACC NO PRSV 0.5 ML IM: CPT | Performed by: NURSE PRACTITIONER

## 2020-03-11 PROCEDURE — 90471 IMMUNIZATION ADMIN: CPT | Performed by: NURSE PRACTITIONER

## 2020-03-11 PROCEDURE — 99396 PREV VISIT EST AGE 40-64: CPT | Performed by: NURSE PRACTITIONER

## 2020-03-11 RX ORDER — OMEPRAZOLE 20 MG/1
20 CAPSULE, DELAYED RELEASE ORAL EVERY MORNING
Qty: 90 CAPSULE | Refills: 3 | Status: SHIPPED | OUTPATIENT
Start: 2020-03-11 | End: 2020-03-12 | Stop reason: SDUPTHER

## 2020-03-11 ASSESSMENT — PATIENT HEALTH QUESTIONNAIRE - PHQ9
2. FEELING DOWN, DEPRESSED OR HOPELESS: 0
1. LITTLE INTEREST OR PLEASURE IN DOING THINGS: 0
SUM OF ALL RESPONSES TO PHQ QUESTIONS 1-9: 0
SUM OF ALL RESPONSES TO PHQ QUESTIONS 1-9: 0
SUM OF ALL RESPONSES TO PHQ9 QUESTIONS 1 & 2: 0

## 2020-03-11 NOTE — PROGRESS NOTES
SUBJECTIVE:    Patient ID: Mana Martin is a59 y.o. female. Mana Martin is here today for Annual Exam (Patient presents for physical exam.); Health Maintenance (Patient is due for mammo. Patient wants to go to [facility] Medication Refill; and Injections (Patient wants to get Shingrix vaccine.)  . HPI:   HPI     Patient reports that she is here today for physical exam.  Patient does have past medical history of GERD and takes omeprazole 20 mg every morning. Patient does feel that this medication is continuing to work well for her symptoms. Patient also has osteoporosis. She does report taking multivitamin daily as well as calcium supplement. Patient is active. Due for DXA. Patient is curious about the Shingrix vaccine. However, patient is also not had a flu vaccine this season and is also open to obtaining that. Patient is due for mammogram and is willing to go to [facility] again. History reviewed. No pertinent past medical history. Prior to Visit Medications    Medication Sig Taking? Authorizing Provider   calcium carbonate (OSCAL) 500 MG TABS tablet Take 500 mg by mouth daily Yes Historical Provider, MD   Multiple Vitamins-Minerals (THERAPEUTIC MULTIVITAMIN-MINERALS) tablet Take 1 tablet by mouth daily. Yes Historical Provider, MD   omeprazole (PRILOSEC) 20 MG delayed release capsule Take 1 capsule by mouth every morning  CASI Knox     Allergies   Allergen Reactions    Erythromycin Anaphylaxis    Pcn [Penicillins] Anaphylaxis    Sulfa Antibiotics Anaphylaxis    Butorphanol      Other reaction(s): Hallucinations     Past Surgical History:   Procedure Laterality Date    APPENDECTOMY      COLON SURGERY      HYSTERECTOMY      INTRAOCULAR LENS PROSTHESIS INSERTION Bilateral 2014     No family history on file.   Social History     Socioeconomic History    Marital status:      Spouse name: Not on file    Number of children: Not on file    Years of education: Not on file    Highest education level: Not on file   Occupational History    Not on file   Social Needs    Financial resource strain: Not on file    Food insecurity     Worry: Not on file     Inability: Not on file    Transportation needs     Medical: Not on file     Non-medical: Not on file   Tobacco Use    Smoking status: Never Smoker    Smokeless tobacco: Never Used   Substance and Sexual Activity    Alcohol use: Not on file    Drug use: Not on file    Sexual activity: Not on file   Lifestyle    Physical activity     Days per week: Not on file     Minutes per session: Not on file    Stress: Not on file   Relationships    Social connections     Talks on phone: Not on file     Gets together: Not on file     Attends Taoist service: Not on file     Active member of club or organization: Not on file     Attends meetings of clubs or organizations: Not on file     Relationship status: Not on file    Intimate partner violence     Fear of current or ex partner: Not on file     Emotionally abused: Not on file     Physically abused: Not on file     Forced sexual activity: Not on file   Other Topics Concern    Not on file   Social History Narrative    Not on file       Review of Systems   Constitutional: Negative for unexpected weight change. HENT: Negative for trouble swallowing. Gastrointestinal: Negative for abdominal pain, constipation and diarrhea. Genitourinary: Negative for difficulty urinating. Skin: Negative. Neurological: Negative for weakness. OBJECTIVE:    Physical Exam  Vitals signs and nursing note reviewed. Constitutional:       General: She is not in acute distress. Appearance: Normal appearance. She is well-developed and normal weight. She is not ill-appearing, toxic-appearing or diaphoretic. HENT:      Head: Normocephalic and atraumatic.       Right Ear: Tympanic membrane, ear canal and external ear normal.      Left Ear: Tympanic membrane, ear canal and external Z12. 44 EDWARD Screening Bilateral   3. Gastroesophageal reflux disease without esophagitis K21.9 DISCONTINUED: omeprazole (PRILOSEC) 20 MG delayed release capsule     CANCELED: Comprehensive Metabolic Panel   4. Osteoporosis, unspecified osteoporosis type, unspecified pathological fracture presence M81.0 Comprehensive Metabolic Panel w/ Reflex to MG     TSH without Reflex     T4, Free     Vitamin D 25 Hydroxy     DEXA BONE DENSITY 2 SITES     CANCELED: Comprehensive Metabolic Panel   5. Flu vaccine need Z23 INFLUENZA, QUADV, 3 YRS AND OLDER, IM PF, PREFILL SYR OR SDV, 0.5ML (HAZEL Wahl)       PLAN:    Orville Boyd: Annual Exam (Patient presents for physical exam.); Health Maintenance (Patient is due for mammo. Patient wants to go to [facility] Medication Refill; and Injections (Patient wants to get Shingrix vaccine.)  flu vaccine today  Lab today  shingrix vaccine needed --if available, please mihai for pt  Diagnosis and orders for this visit are above. Please note that this chart was generated using dragon dictationsoftware. Although every effort was made to ensure the accuracy of this automated transcription, some errors in transcription may have occurred.

## 2020-03-13 LAB — URINE CULTURE, ROUTINE: NORMAL

## 2020-03-20 ASSESSMENT — ENCOUNTER SYMPTOMS
CONSTIPATION: 0
DIARRHEA: 0
ABDOMINAL PAIN: 0
TROUBLE SWALLOWING: 0

## 2020-06-04 ENCOUNTER — HOSPITAL ENCOUNTER (OUTPATIENT)
Dept: WOMENS IMAGING | Age: 60
Discharge: HOME | End: 2020-06-04
Payer: COMMERCIAL

## 2020-06-04 PROCEDURE — 77080 DXA BONE DENSITY AXIAL: CPT

## 2020-06-25 RX ORDER — OMEPRAZOLE 20 MG/1
CAPSULE, DELAYED RELEASE ORAL
Qty: 90 CAPSULE | Refills: 1 | Status: SHIPPED | OUTPATIENT
Start: 2020-06-25 | End: 2020-12-22

## 2020-08-19 ENCOUNTER — OFFICE VISIT (OUTPATIENT)
Age: 60
End: 2020-08-19

## 2020-08-19 VITALS — OXYGEN SATURATION: 95 % | HEART RATE: 83 BPM | TEMPERATURE: 97.1 F

## 2020-08-21 LAB — SARS-COV-2, NAA: NOT DETECTED

## 2020-09-09 ENCOUNTER — NURSE TRIAGE (OUTPATIENT)
Dept: OTHER | Facility: CLINIC | Age: 60
End: 2020-09-09

## 2020-09-09 NOTE — TELEPHONE ENCOUNTER
Reason for Disposition   Cut with numbness (loss of sensation) of finger    Answer Assessment - Initial Assessment Questions  1. MECHANISM: \"How did the injury happen? \"       Slammed finger in door  2. ONSET: \"When did the injury happen? \" (Minutes or hours ago)       8/25/20  3. LOCATION: \"What part of the finger is injured? \" \"Is the nail damaged? \"       Right thumb. The nail is black  4. APPEARANCE of the INJURY: \"What does the injury look like? \"       The thumb is black   5. SEVERITY: \"Can you use the hand normally? \"  \"Can you bend your fingers into a ball and then fully open them? \"      yes  6. SIZE: For cuts, bruises, or swelling, ask: \"How large is it? \" (e.g., inches or centimeters;  entire finger)       No swelling  7. PAIN: \"Is there pain? \" If so, ask: \"How bad is the pain? \"    (e.g., Scale 1-10; or mild, moderate, severe)      5/10  8. TETANUS: For any breaks in the skin, ask: \"When was the last tetanus booster? \"      unsure  9. OTHER SYMPTOMS: \"Do you have any other symptoms? \"      Tingling sensation at tip of thumb  10. PREGNANCY: \"Is there any chance you are pregnant? \" \"When was your last menstrual period? \"        no    Protocols used: FINGER INJURY-ADULT-[place]    Caller provided care advice and instructed to call back with worsening symptoms.

## 2021-03-29 ENCOUNTER — OFFICE VISIT (OUTPATIENT)
Dept: FAMILY MEDICINE CLINIC | Age: 61
End: 2021-03-29
Payer: COMMERCIAL

## 2021-03-29 VITALS
RESPIRATION RATE: 18 BRPM | OXYGEN SATURATION: 99 % | BODY MASS INDEX: 25.6 KG/M2 | HEIGHT: 59 IN | TEMPERATURE: 96.4 F | SYSTOLIC BLOOD PRESSURE: 118 MMHG | HEART RATE: 70 BPM | WEIGHT: 127 LBS | DIASTOLIC BLOOD PRESSURE: 72 MMHG

## 2021-03-29 DIAGNOSIS — R42 VERTIGO: ICD-10-CM

## 2021-03-29 DIAGNOSIS — J01.90 ACUTE BACTERIAL SINUSITIS: Primary | ICD-10-CM

## 2021-03-29 DIAGNOSIS — B96.89 ACUTE BACTERIAL SINUSITIS: Primary | ICD-10-CM

## 2021-03-29 PROCEDURE — 96372 THER/PROPH/DIAG INJ SC/IM: CPT | Performed by: NURSE PRACTITIONER

## 2021-03-29 PROCEDURE — 99213 OFFICE O/P EST LOW 20 MIN: CPT | Performed by: NURSE PRACTITIONER

## 2021-03-29 RX ORDER — FLUTICASONE PROPIONATE 50 MCG
1 SPRAY, SUSPENSION (ML) NASAL DAILY
Qty: 1 BOTTLE | Refills: 0 | Status: SHIPPED | OUTPATIENT
Start: 2021-03-29 | End: 2021-07-30

## 2021-03-29 RX ORDER — MECLIZINE HYDROCHLORIDE 25 MG/1
25 TABLET ORAL 3 TIMES DAILY PRN
Qty: 30 TABLET | Refills: 0 | Status: SHIPPED | OUTPATIENT
Start: 2021-03-29 | End: 2021-04-08

## 2021-03-29 RX ORDER — TRIAMCINOLONE ACETONIDE 40 MG/ML
40 INJECTION, SUSPENSION INTRA-ARTICULAR; INTRAMUSCULAR ONCE
Status: COMPLETED | OUTPATIENT
Start: 2021-03-29 | End: 2021-03-29

## 2021-03-29 RX ORDER — DEXAMETHASONE SODIUM PHOSPHATE 10 MG/ML
4 INJECTION INTRAMUSCULAR; INTRAVENOUS ONCE
Status: COMPLETED | OUTPATIENT
Start: 2021-03-29 | End: 2021-03-29

## 2021-03-29 RX ORDER — DOXYCYCLINE HYCLATE 100 MG
100 TABLET ORAL 2 TIMES DAILY
Qty: 20 TABLET | Refills: 0 | Status: SHIPPED | OUTPATIENT
Start: 2021-03-29 | End: 2021-04-08

## 2021-03-29 RX ADMIN — DEXAMETHASONE SODIUM PHOSPHATE 4 MG: 10 INJECTION INTRAMUSCULAR; INTRAVENOUS at 14:17

## 2021-03-29 RX ADMIN — TRIAMCINOLONE ACETONIDE 40 MG: 40 INJECTION, SUSPENSION INTRA-ARTICULAR; INTRAMUSCULAR at 14:18

## 2021-03-29 ASSESSMENT — PATIENT HEALTH QUESTIONNAIRE - PHQ9
SUM OF ALL RESPONSES TO PHQ9 QUESTIONS 1 & 2: 0
SUM OF ALL RESPONSES TO PHQ QUESTIONS 1-9: 0
SUM OF ALL RESPONSES TO PHQ QUESTIONS 1-9: 0
1. LITTLE INTEREST OR PLEASURE IN DOING THINGS: 0
2. FEELING DOWN, DEPRESSED OR HOPELESS: 0

## 2021-03-29 ASSESSMENT — ENCOUNTER SYMPTOMS: SINUS PRESSURE: 1

## 2021-03-29 NOTE — PROGRESS NOTES
SUBJECTIVE:    Patient ID: Abi Almendarez is a58 y.o. female. Abi Almendarez is here today for Dizziness (Patient presents c/o dizziness x 2 weeks. Patient is also c/o focusing problems with her vision.), Facial Pain, Otalgia, Headache (lower back of head pain), and Neck Pain  . HPI:   HPI     Pressure to face/sinus areas, pressure to lower skull  No fever  No difficulty breathing through nose as she usually has this in am but has not had difficulty during this timeframe. Pt has been working in basement and does have h/o mold allergy. Dizziness-noted with head movement and eye movement, limiting head movement to prevent problems. Eyes feel they aren't focusing properly by end of day  Some drainage in throat at times. Sleeping ok with antihistamines  tx-sudafed, steam, antihistamines, sprays    Past Medical History:   Diagnosis Date    Osteopenia after menopause 2017     Prior to Visit Medications    Medication Sig Taking? Authorizing Provider   meclizine (ANTIVERT) 25 MG tablet Take 1 tablet by mouth 3 times daily as needed for Dizziness Yes CASI Knox   doxycycline hyclate (VIBRA-TABS) 100 MG tablet Take 1 tablet by mouth 2 times daily for 10 days Yes CASI Knox   fluticasone (FLONASE) 50 MCG/ACT nasal spray 1 spray by Each Nostril route daily Yes CASI Knox   omeprazole (PRILOSEC) 20 MG delayed release capsule TAKE ONE CAPSULE BY MOUTH EVERY MORNING Yes CASI Knox   calcium carbonate (OSCAL) 500 MG TABS tablet Take 500 mg by mouth daily Yes Historical Provider, MD   Multiple Vitamins-Minerals (THERAPEUTIC MULTIVITAMIN-MINERALS) tablet Take 1 tablet by mouth daily.  Yes Historical Provider, MD     Allergies   Allergen Reactions    Erythromycin Anaphylaxis    Pcn [Penicillins] Anaphylaxis    Sulfa Antibiotics Anaphylaxis    Butorphanol      Other reaction(s): Hallucinations     Past Surgical History:   Procedure Laterality Date    APPENDECTOMY      COLON SURGERY      HYSTERECTOMY      INTRAOCULAR LENS PROSTHESIS INSERTION Bilateral 2014     History reviewed. No pertinent family history. Social History     Socioeconomic History    Marital status:      Spouse name: Not on file    Number of children: Not on file    Years of education: Not on file    Highest education level: Not on file   Occupational History    Not on file   Social Needs    Financial resource strain: Not on file    Food insecurity     Worry: Not on file     Inability: Not on file    Transportation needs     Medical: Not on file     Non-medical: Not on file   Tobacco Use    Smoking status: Never Smoker    Smokeless tobacco: Never Used   Substance and Sexual Activity    Alcohol use: Not on file    Drug use: Not on file    Sexual activity: Not on file   Lifestyle    Physical activity     Days per week: Not on file     Minutes per session: Not on file    Stress: Not on file   Relationships    Social connections     Talks on phone: Not on file     Gets together: Not on file     Attends Mormonism service: Not on file     Active member of club or organization: Not on file     Attends meetings of clubs or organizations: Not on file     Relationship status: Not on file    Intimate partner violence     Fear of current or ex partner: Not on file     Emotionally abused: Not on file     Physically abused: Not on file     Forced sexual activity: Not on file   Other Topics Concern    Not on file   Social History Narrative    Not on file       Review of Systems   Constitutional: Negative for fever. HENT: Positive for postnasal drip and sinus pressure. Negative for congestion. Neurological: Positive for dizziness and headaches (base of skull pressure). Negative for light-headedness. OBJECTIVE:    Physical Exam  Vitals signs and nursing note reviewed. Constitutional:       General: She is not in acute distress. Appearance: Normal appearance. She is well-developed and normal weight.  She is not ill-appearing or toxic-appearing. HENT:      Head: Normocephalic and atraumatic. Right Ear: Ear canal and external ear normal. There is no impacted cerumen. Tympanic membrane is bulging. Left Ear: Tympanic membrane, ear canal and external ear normal. There is no impacted cerumen. Eyes:      Conjunctiva/sclera: Conjunctivae normal.      Pupils: Pupils are equal, round, and reactive to light. Neck:      Musculoskeletal: Normal range of motion and neck supple. No neck rigidity. Cardiovascular:      Rate and Rhythm: Normal rate and regular rhythm. Pulmonary:      Effort: Pulmonary effort is normal. No respiratory distress. Breath sounds: Normal breath sounds. Lymphadenopathy:      Cervical: No cervical adenopathy. Skin:     General: Skin is warm and dry. Capillary Refill: Capillary refill takes less than 2 seconds. Neurological:      General: No focal deficit present. Mental Status: She is alert and oriented to person, place, and time. Psychiatric:         Mood and Affect: Mood normal.         Behavior: Behavior normal.         Thought Content: Thought content normal.         Judgment: Judgment normal.         /72 (Site: Left Upper Arm, Position: Sitting, Cuff Size: Small Adult)   Pulse 70   Temp 96.4 °F (35.8 °C) (Temporal)   Resp 18   Ht 4' 11\" (1.499 m)   Wt 127 lb (57.6 kg)   SpO2 99%   BMI 25.65 kg/m²      ASSESSMENT:      ICD-10-CM    1. Acute bacterial sinusitis  J01.90 dexamethasone (DECADRON) injection 4 mg    B96.89 triamcinolone acetonide (KENALOG-40) injection 40 mg     doxycycline hyclate (VIBRA-TABS) 100 MG tablet     fluticasone (FLONASE) 50 MCG/ACT nasal spray   2. Vertigo  R42 dexamethasone (DECADRON) injection 4 mg     triamcinolone acetonide (KENALOG-40) injection 40 mg     meclizine (ANTIVERT) 25 MG tablet     fluticasone (FLONASE) 50 MCG/ACT nasal spray       PLAN:    Devika Watson: Dizziness (Patient presents c/o dizziness x 2 weeks.  Patient is also c/o focusing problems with her vision.), Facial Pain, Otalgia, Headache (lower back of head pain), and Neck Pain  plan as above  F/u within 3-4d if sx are not showing improvement or worsen. Diagnosis and orders for this visit are above. Please note that this chart was generated using dragon dictationsoftware. Although every effort was made to ensure the accuracy of this automated transcription, some errors in transcription may have occurred.

## 2021-03-29 NOTE — PROGRESS NOTES
DEXAMETHASONE 4MG GIVEN INTRAMUSCULARLY IN RIGHT GLUTEAL. PT TOLERATED WELL. Kenalog 40 mg given intramuscularly to right gluteal. Pt tolerated well.

## 2021-03-29 NOTE — PATIENT INSTRUCTIONS
Patient Education        Vertigo: Exercises  Introduction  Here are some examples of exercises for you to try. The exercises may be suggested for a condition or for rehabilitation. Start each exercise slowly. Ease off the exercises if you start to have pain. You will be told when to start these exercises and which ones will work best for you. How to do the exercises  Exercise 1   1. Stand with a chair in front of you and a wall behind you. If you begin to fall, you may use them for support. 2. Stand with your feet together and your arms at your sides. 3. Move your head up and down 10 times. Exercise 2   1. Move your head side to side 10 times. Exercise 3   1. Move your head diagonally up and down 10 times. Exercise 4   1. Move your head diagonally up and down 10 times on the other side. Follow-up care is a key part of your treatment and safety. Be sure to make and go to all appointments, and call your doctor if you are having problems. It's also a good idea to know your test results and keep a list of the medicines you take. Where can you learn more? Go to [email or web address] org and sign in to your [employer] account. Enter F349 in the [employer] box to learn more about \"Vertigo: Exercises. \"     If you do not have an account, please click on the \"Sign Up Now\" link. Current as of: December 2, 2020               Content Version: 12.8  © 2160-0545 Healthwise, Incorporated. Care instructions adapted under license by [facility] ([facility]). If you have questions about a medical condition or this instruction, always ask your healthcare professional. [place] [address] any warranty or liability for your use of this information. Patient Education         Vertigo: Head Movements That Help (01:53)  Your health professional recommends that you watch this short online health video. Learn simple head movements to help with vertigo and balance problems.      How to watch the video    Scan the QR code   OR Visit the website    https://[email or web address] [email or web address]   Current as of: December 2, 2020               Content Version: 12.8  © 2006-2021 Healthwise, Incorporated. Care instructions adapted under license by [facility] ([facility]). If you have questions about a medical condition or this instruction, always ask your healthcare professional. [place] [address] any warranty or liability for your use of this information.

## 2021-04-05 ENCOUNTER — OFFICE VISIT (OUTPATIENT)
Dept: FAMILY MEDICINE CLINIC | Age: 61
End: 2021-04-05
Payer: COMMERCIAL

## 2021-04-05 VITALS
OXYGEN SATURATION: 98 % | DIASTOLIC BLOOD PRESSURE: 62 MMHG | HEART RATE: 68 BPM | TEMPERATURE: 98.1 F | SYSTOLIC BLOOD PRESSURE: 110 MMHG

## 2021-04-05 DIAGNOSIS — B96.89 ACUTE BACTERIAL SINUSITIS: Primary | ICD-10-CM

## 2021-04-05 DIAGNOSIS — J01.90 ACUTE BACTERIAL SINUSITIS: Primary | ICD-10-CM

## 2021-04-05 DIAGNOSIS — R51.9 PRESSURE AND PAIN OF LEFT SIDE OF FACE: ICD-10-CM

## 2021-04-05 DIAGNOSIS — J34.89 SINUS PAIN: ICD-10-CM

## 2021-04-05 PROCEDURE — 99214 OFFICE O/P EST MOD 30 MIN: CPT | Performed by: NURSE PRACTITIONER

## 2021-04-05 RX ORDER — METHYLPREDNISOLONE 4 MG/1
TABLET ORAL
Qty: 1 KIT | Refills: 0 | Status: SHIPPED | OUTPATIENT
Start: 2021-04-05 | End: 2021-07-30

## 2021-04-05 RX ORDER — LEVOFLOXACIN 500 MG/1
500 TABLET, FILM COATED ORAL DAILY
Qty: 10 TABLET | Refills: 0 | Status: SHIPPED | OUTPATIENT
Start: 2021-04-05 | End: 2021-04-15

## 2021-04-05 ASSESSMENT — ENCOUNTER SYMPTOMS
SORE THROAT: 0
SINUS PAIN: 1
EYE PAIN: 1
SINUS PRESSURE: 1

## 2021-04-05 ASSESSMENT — PATIENT HEALTH QUESTIONNAIRE - PHQ9
SUM OF ALL RESPONSES TO PHQ QUESTIONS 1-9: 0
SUM OF ALL RESPONSES TO PHQ9 QUESTIONS 1 & 2: 0

## 2021-04-05 NOTE — PROGRESS NOTES
SUBJECTIVE:    Patient ID: Alcides Quinteros is a58 y.o. female. Alcides Quinteros is here today for Sinus Problem  . HPI:   HPI   Patient is here today as a follow-up for visit on March 29, 2021. At that time patient was complaining of some vertigo type symptoms as well as facial pressure. I did feel that patient was presenting with sinusitis and did treat her with doxycycline and we did do a steroid injection that day. Patient does state that she did initially feel she was improving but then symptoms seem to present even more strongly. Left side of face feels \"like it is going to explode\"  No fever at home. Does state that her eyelids feel heavy at times. There is been no weakness reported, no change of vision. Patient has not reported any skin tenderness and there is no rash that is visible. Patient does state that she continues to have some tenderness to the left posterior aspect of the neck and around the left ear and face which feels full or pressure. Patient has been adhering to doxycycline treatment plan. She and I have spoken of the need for imaging as this is not a typical presentation for her with these problems. I will be ordering CT imaging and patient will be following up with her ophthalmologist.  I have also urged patient to go straight to ER if there is any change in neurological type symptoms. Patient is also aware that I will be out of the office for the rest of the week and she has been given the nurse number to call in regard to these tests and results as another provider will be able to review those. Past Medical History:   Diagnosis Date    Osteopenia after menopause 2017     Prior to Visit Medications    Medication Sig Taking? Authorizing Provider   levoFLOXacin (LEVAQUIN) 500 MG tablet Take 1 tablet by mouth daily for 10 days Yes CASI Knox   methylPREDNISolone (MEDROL DOSEPACK) 4 MG tablet Take by mouth.  Yes CASI Knox   meclizine (ANTIVERT) 25 MG tablet Take 1 tablet by mouth 3 times daily as needed for Dizziness Yes CASI Knox   doxycycline hyclate (VIBRA-TABS) 100 MG tablet Take 1 tablet by mouth 2 times daily for 10 days Yes CASI Knox   fluticasone (FLONASE) 50 MCG/ACT nasal spray 1 spray by Each Nostril route daily Yes CASI Knox   omeprazole (PRILOSEC) 20 MG delayed release capsule TAKE ONE CAPSULE BY MOUTH EVERY MORNING Yes CASI Knox   calcium carbonate (OSCAL) 500 MG TABS tablet Take 500 mg by mouth daily Yes Historical Provider, MD   Multiple Vitamins-Minerals (THERAPEUTIC MULTIVITAMIN-MINERALS) tablet Take 1 tablet by mouth daily. Yes Historical Provider, MD     Allergies   Allergen Reactions    Erythromycin Anaphylaxis    Pcn [Penicillins] Anaphylaxis    Sulfa Antibiotics Anaphylaxis    Butorphanol      Other reaction(s): Hallucinations     Past Surgical History:   Procedure Laterality Date    APPENDECTOMY      COLON SURGERY      HYSTERECTOMY      INTRAOCULAR LENS PROSTHESIS INSERTION Bilateral 2014     History reviewed. No pertinent family history.   Social History     Socioeconomic History    Marital status:      Spouse name: Not on file    Number of children: Not on file    Years of education: Not on file    Highest education level: Not on file   Occupational History    Not on file   Social Needs    Financial resource strain: Not on file    Food insecurity     Worry: Not on file     Inability: Not on file    Transportation needs     Medical: Not on file     Non-medical: Not on file   Tobacco Use    Smoking status: Never Smoker    Smokeless tobacco: Never Used   Substance and Sexual Activity    Alcohol use: Not on file    Drug use: Not on file    Sexual activity: Not on file   Lifestyle    Physical activity     Days per week: Not on file     Minutes per session: Not on file    Stress: Not on file   Relationships    Social connections     Talks on phone: Not on file     Gets together: Not on file Attends Faith service: Not on file     Active member of club or organization: Not on file     Attends meetings of clubs or organizations: Not on file     Relationship status: Not on file    Intimate partner violence     Fear of current or ex partner: Not on file     Emotionally abused: Not on file     Physically abused: Not on file     Forced sexual activity: Not on file   Other Topics Concern    Not on file   Social History Narrative    Not on file       Review of Systems   Constitutional: Negative for fever. HENT: Positive for sinus pressure and sinus pain. Negative for sore throat. Eyes: Positive for pain (left eye pressure). Negative for visual disturbance. Musculoskeletal: Positive for neck pain (left side). Neurological: Positive for dizziness. Negative for weakness. OBJECTIVE:    Physical Exam  Vitals signs and nursing note reviewed. Constitutional:       General: She is not in acute distress. Appearance: Normal appearance. She is well-developed and normal weight. She is not ill-appearing or diaphoretic. HENT:      Head: Normocephalic and atraumatic. Right Ear: Tympanic membrane, ear canal and external ear normal.      Left Ear: Tympanic membrane, ear canal and external ear normal.      Nose:      Right Sinus: No maxillary sinus tenderness or frontal sinus tenderness. Left Sinus: Maxillary sinus tenderness and frontal sinus tenderness present. Mouth/Throat:      Mouth: Mucous membranes are moist.      Pharynx: Oropharynx is clear. Uvula midline. No oropharyngeal exudate or posterior oropharyngeal erythema. Eyes:      General: No scleral icterus. Right eye: No discharge. Left eye: No discharge. Extraocular Movements: Extraocular movements intact. Conjunctiva/sclera: Conjunctivae normal.      Pupils: Pupils are equal, round, and reactive to light. Neck:      Musculoskeletal: Normal range of motion and neck supple.  No neck rigidity or muscular tenderness. Trachea: No tracheal deviation. Cardiovascular:      Rate and Rhythm: Normal rate and regular rhythm. Pulses: Normal pulses. Heart sounds: Normal heart sounds. Pulmonary:      Effort: Pulmonary effort is normal. No respiratory distress. Breath sounds: Normal breath sounds. Lymphadenopathy:      Cervical: No cervical adenopathy. Skin:     General: Skin is warm and dry. Capillary Refill: Capillary refill takes less than 2 seconds. Neurological:      General: No focal deficit present. Mental Status: She is alert and oriented to person, place, and time. Mental status is at baseline. Cranial Nerves: No cranial nerve deficit. Psychiatric:         Mood and Affect: Mood normal.         Behavior: Behavior normal.         Thought Content: Thought content normal.         Judgment: Judgment normal.         /62 (Site: Right Upper Arm, Position: Sitting, Cuff Size: Medium Adult)   Pulse 68   Temp 98.1 °F (36.7 °C) (Infrared)   SpO2 98%      ASSESSMENT:      ICD-10-CM    1. Acute bacterial sinusitis  J01.90 levoFLOXacin (LEVAQUIN) 500 MG tablet    B96.89 methylPREDNISolone (MEDROL DOSEPACK) 4 MG tablet   2. Sinus pain  J34.89 CT HEAD WO CONTRAST     CT SINUS WO CONTRAST     methylPREDNISolone (MEDROL DOSEPACK) 4 MG tablet   3. Pressure and pain of left side of face  R51.9 CT HEAD WO CONTRAST     methylPREDNISolone (MEDROL DOSEPACK) 4 MG tablet       PLAN:    Ramesh Levo: Sinus Problem  Pt to make opt/ophth eval appt tomorrow  CTs are needed asap  If any new/different severe symptoms, pt is aware to go straight to ER. Stop doxy and change to levaquin. Pt is aware of risk vs benefit of this tx including tendon rupture. Give number to pt to call desk for results as I won't be here and she doesn't need to wait. Diagnosis and orders for this visit are above. Please note that this chart was generated using dragon dictationsoftware.   Although every

## 2021-04-08 ENCOUNTER — TELEPHONE (OUTPATIENT)
Dept: FAMILY MEDICINE CLINIC | Age: 61
End: 2021-04-08

## 2021-04-08 ENCOUNTER — HOSPITAL ENCOUNTER (OUTPATIENT)
Dept: GENERAL RADIOLOGY | Age: 61
Discharge: HOME | End: 2021-04-08
Payer: COMMERCIAL

## 2021-04-08 DIAGNOSIS — J34.89 SINUS PAIN: ICD-10-CM

## 2021-04-08 DIAGNOSIS — R51.9 PRESSURE AND PAIN OF LEFT SIDE OF FACE: ICD-10-CM

## 2021-04-08 NOTE — TELEPHONE ENCOUNTER
Patient was scheduled for a Radiology appointment today even though tests had not been authorized by insurance. Radiology was told to proceed by [facility] department. Patient was advised to reschedule tests as per her insurance company since tests were still in clinical review and provider was not available to complete a [id number] review. Patient was advised that she would be called and updated when final decision was made by her insurance. Patient added that she was feeling stable and not worsening and was aware of what she needed to do if symptoms were to worsen or change.

## 2021-04-13 NOTE — TELEPHONE ENCOUNTER
Patient states that she is doing much better and wants to see what the eye doctor says. Patient states that she will call and update on Thursday. CT's have been approved. Patient aware.

## 2021-04-14 NOTE — TELEPHONE ENCOUNTER
Patient saw the ophthalmologist today and was told by him that he doesn't feel there is a medical need for the CT scans that were ordered. He doesn't feel there are any risks for stroke or tumor. 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 She was told that this would resolve on its own in a few months and that the worst case scenario is she might need glasses.

## 2021-07-30 ENCOUNTER — HOSPITAL ENCOUNTER (OUTPATIENT)
Dept: GENERAL RADIOLOGY | Age: 61
Discharge: HOME | End: 2021-07-30
Payer: COMMERCIAL

## 2021-07-30 ENCOUNTER — OFFICE VISIT (OUTPATIENT)
Dept: FAMILY MEDICINE CLINIC | Age: 61
End: 2021-07-30
Payer: COMMERCIAL

## 2021-07-30 VITALS
WEIGHT: 121 LBS | DIASTOLIC BLOOD PRESSURE: 70 MMHG | RESPIRATION RATE: 20 BRPM | BODY MASS INDEX: 24.39 KG/M2 | HEIGHT: 59 IN | SYSTOLIC BLOOD PRESSURE: 102 MMHG | TEMPERATURE: 97.4 F | OXYGEN SATURATION: 98 % | HEART RATE: 78 BPM

## 2021-07-30 DIAGNOSIS — G89.29 CHRONIC PAIN OF RIGHT KNEE: ICD-10-CM

## 2021-07-30 DIAGNOSIS — Z00.00 ANNUAL PHYSICAL EXAM: ICD-10-CM

## 2021-07-30 DIAGNOSIS — M25.561 CHRONIC PAIN OF RIGHT KNEE: ICD-10-CM

## 2021-07-30 DIAGNOSIS — Z12.31 SCREENING MAMMOGRAM, ENCOUNTER FOR: ICD-10-CM

## 2021-07-30 DIAGNOSIS — K21.9 GASTROESOPHAGEAL REFLUX DISEASE WITHOUT ESOPHAGITIS: ICD-10-CM

## 2021-07-30 DIAGNOSIS — Z00.00 ANNUAL PHYSICAL EXAM: Primary | ICD-10-CM

## 2021-07-30 LAB
ALBUMIN SERPL-MCNC: 4.6 G/DL (ref 3.5–5.2)
ALP BLD-CCNC: 69 U/L (ref 35–104)
ALT SERPL-CCNC: 18 U/L (ref 5–33)
ANION GAP SERPL CALCULATED.3IONS-SCNC: 10 MMOL/L (ref 7–19)
AST SERPL-CCNC: 20 U/L (ref 5–32)
BACTERIA: NEGATIVE /HPF
BASOPHILS ABSOLUTE: 0 K/UL (ref 0–0.2)
BASOPHILS RELATIVE PERCENT: 0.6 % (ref 0–1)
BILIRUB SERPL-MCNC: 0.5 MG/DL (ref 0.2–1.2)
BILIRUBIN URINE: NEGATIVE
BLOOD, URINE: NEGATIVE
BUN BLDV-MCNC: 11 MG/DL (ref 8–23)
CALCIUM SERPL-MCNC: 9.8 MG/DL (ref 8.8–10.2)
CHLORIDE BLD-SCNC: 101 MMOL/L (ref 98–111)
CHOLESTEROL, TOTAL: 202 MG/DL (ref 160–199)
CLARITY: CLEAR
CO2: 27 MMOL/L (ref 22–29)
COLOR: YELLOW
CREAT SERPL-MCNC: 0.8 MG/DL (ref 0.5–0.9)
CRYSTALS, UA: ABNORMAL /HPF
EOSINOPHILS ABSOLUTE: 0.1 K/UL (ref 0–0.6)
EOSINOPHILS RELATIVE PERCENT: 2.2 % (ref 0–5)
EPITHELIAL CELLS, UA: 2 /HPF (ref 0–5)
GFR AFRICAN AMERICAN: >59
GFR NON-AFRICAN AMERICAN: >60
GLUCOSE BLD-MCNC: 88 MG/DL (ref 74–109)
GLUCOSE URINE: NEGATIVE MG/DL
HCT VFR BLD CALC: 41.4 % (ref 37–47)
HDLC SERPL-MCNC: 55 MG/DL (ref 65–121)
HEMOGLOBIN: 13.4 G/DL (ref 12–16)
HYALINE CASTS: 2 /HPF (ref 0–8)
IMMATURE GRANULOCYTES #: 0 K/UL
KETONES, URINE: NEGATIVE MG/DL
LDL CHOLESTEROL CALCULATED: 123 MG/DL
LEUKOCYTE ESTERASE, URINE: ABNORMAL
LYMPHOCYTES ABSOLUTE: 1.6 K/UL (ref 1.1–4.5)
LYMPHOCYTES RELATIVE PERCENT: 28.8 % (ref 20–40)
MCH RBC QN AUTO: 29.1 PG (ref 27–31)
MCHC RBC AUTO-ENTMCNC: 32.4 G/DL (ref 33–37)
MCV RBC AUTO: 89.8 FL (ref 81–99)
MONOCYTES ABSOLUTE: 0.4 K/UL (ref 0–0.9)
MONOCYTES RELATIVE PERCENT: 7.4 % (ref 0–10)
NEUTROPHILS ABSOLUTE: 3.3 K/UL (ref 1.5–7.5)
NEUTROPHILS RELATIVE PERCENT: 60.6 % (ref 50–65)
NITRITE, URINE: NEGATIVE
PDW BLD-RTO: 13.9 % (ref 11.5–14.5)
PH UA: 5.5 (ref 5–8)
PLATELET # BLD: 240 K/UL (ref 130–400)
PMV BLD AUTO: 9.9 FL (ref 9.4–12.3)
POTASSIUM REFLEX MAGNESIUM: 4.2 MMOL/L (ref 3.5–5)
PROTEIN UA: ABNORMAL MG/DL
RBC # BLD: 4.61 M/UL (ref 4.2–5.4)
RBC UA: 1 /HPF (ref 0–4)
SODIUM BLD-SCNC: 138 MMOL/L (ref 136–145)
SPECIFIC GRAVITY UA: 1.01 (ref 1–1.03)
TOTAL PROTEIN: 7.6 G/DL (ref 6.6–8.7)
TRIGL SERPL-MCNC: 119 MG/DL (ref 0–149)
TSH REFLEX FT4: 2.22 UIU/ML (ref 0.35–5.5)
UROBILINOGEN, URINE: 0.2 E.U./DL
VITAMIN D 25-HYDROXY: 44.8 NG/ML
WBC # BLD: 5.4 K/UL (ref 4.8–10.8)
WBC UA: 3 /HPF (ref 0–5)

## 2021-07-30 PROCEDURE — 99396 PREV VISIT EST AGE 40-64: CPT | Performed by: NURSE PRACTITIONER

## 2021-07-30 PROCEDURE — 73562 X-RAY EXAM OF KNEE 3: CPT

## 2021-07-30 RX ORDER — OMEPRAZOLE 20 MG/1
CAPSULE, DELAYED RELEASE ORAL
Qty: 90 CAPSULE | Refills: 3 | Status: SHIPPED | OUTPATIENT
Start: 2021-07-30 | End: 2022-09-16

## 2021-07-30 SDOH — ECONOMIC STABILITY: FOOD INSECURITY: WITHIN THE PAST 12 MONTHS, THE FOOD YOU BOUGHT JUST DIDN'T LAST AND YOU DIDN'T HAVE MONEY TO GET MORE.: NEVER TRUE

## 2021-07-30 SDOH — ECONOMIC STABILITY: FOOD INSECURITY: WITHIN THE PAST 12 MONTHS, YOU WORRIED THAT YOUR FOOD WOULD RUN OUT BEFORE YOU GOT MONEY TO BUY MORE.: NEVER TRUE

## 2021-07-30 SDOH — ECONOMIC STABILITY: TRANSPORTATION INSECURITY
IN THE PAST 12 MONTHS, HAS THE LACK OF TRANSPORTATION KEPT YOU FROM MEDICAL APPOINTMENTS OR FROM GETTING MEDICATIONS?: NO

## 2021-07-30 SDOH — ECONOMIC STABILITY: TRANSPORTATION INSECURITY
IN THE PAST 12 MONTHS, HAS LACK OF TRANSPORTATION KEPT YOU FROM MEETINGS, WORK, OR FROM GETTING THINGS NEEDED FOR DAILY LIVING?: NO

## 2021-07-30 ASSESSMENT — SOCIAL DETERMINANTS OF HEALTH (SDOH): HOW HARD IS IT FOR YOU TO PAY FOR THE VERY BASICS LIKE FOOD, HOUSING, MEDICAL CARE, AND HEATING?: NOT HARD AT ALL

## 2021-07-30 NOTE — PROGRESS NOTES
SUBJECTIVE:    Patient ID: Percell Boxer is a58 y.o. female. Percell Boxer is here today for Annual Exam (Patient presents for physical exam and fasting blood work.)  . HPI:   HPI     Is here today for annual exam.  Patient states overall she is doing quite well. She does have GERD and reports that daily use of omeprazole is controlling symptoms. Patient does see Dr. Vi Gurrola gastroenterologist.  Last colonoscopy was 2017 and is noted for a 5-year recall. Patient does report having some intermittent right knee symptoms. Patient states that she can feel a bony change to her right knee compared to her left. There is been no injury to this knee reported. However, patient does have past medical history of running which she has changed over the recent years to elliptical training. She does state at times she feels she has to distribute weight just right on her foot or the knee may become symptomatic. She has not had any treatment for the knee and has not had any imaging work-up. Patient does have history of osteopenia. She is using calcium and vitamin D and continues to use daily multivitamin as well. Last bone density test was 2020 and repeat needed next year. Mammogram is due and patient will be ordered this today. One of her last visits in April 2021 patient was having visual changes occur. She was following up closely with her eye doctor who has now diagnosed her with a benign condition. It is interesting to note that symptoms began 4 days after her [employer] COVID-19 vaccine. Past Medical History:   Diagnosis Date    Osteopenia after menopause 2017     Prior to Visit Medications    Medication Sig Taking?  Authorizing Provider   omeprazole (PRILOSEC) 20 MG delayed release capsule TAKE ONE CAPSULE BY MOUTH EVERY MORNING Yes CASI Knox   diclofenac sodium (VOLTAREN) 1 % GEL Apply 4 g topically 4 times daily as needed for Pain Yes CASI Knox   calcium carbonate (OSCAL) Organization Meetings:     Marital Status:    Intimate Partner Violence:     Fear of Current or Ex-Partner:     Emotionally Abused:     Physically Abused:     Sexually Abused:        Review of Systems    OBJECTIVE:    Physical Exam    /70 (Site: Left Upper Arm, Position: Sitting, Cuff Size: Small Adult)   Pulse 78   Temp 97.4 °F (36.3 °C) (Temporal)   Resp 20   Ht 4' 11\" (1.499 m)   Wt 121 lb (54.9 kg)   SpO2 98%   BMI 24.44 kg/m²      ASSESSMENT:      ICD-10-CM    1. Annual physical exam  Z00.00 Urinalysis Reflex to Culture     CBC Auto Differential     Comprehensive Metabolic Panel w/ Reflex to MG     Lipid Panel     TSH WITH REFLEX TO FT4     Vitamin D 25 Hydroxy   2. Screening mammogram, encounter for  Z12.31 Mammography screening bilateral   3. Gastroesophageal reflux disease without esophagitis  K21.9 omeprazole (PRILOSEC) 20 MG delayed release capsule   4. Chronic pain of right knee  M25.561 XR KNEE RIGHT (3 VIEWS)    G89.29 diclofenac sodium (VOLTAREN) 1 % GEL       PLAN:    Daniela Watson: Annual Exam (Patient presents for physical exam and fasting blood work.)  lab and imaging today  Continue med as above  Use topical tx to knee prn for inflammation/pain  F/u prn and yearly. Diagnosis and orders for this visit are above. Please note that this chart was generated using dragon dictationsoftware. Although every effort was made to ensure the accuracy of this automated transcription, some errors in transcription may have occurred.

## 2021-08-10 ENCOUNTER — OFFICE VISIT (OUTPATIENT)
Dept: PRIMARY CARE CLINIC | Age: 61
End: 2021-08-10
Payer: COMMERCIAL

## 2021-08-10 DIAGNOSIS — Z11.1 VISIT FOR TB SKIN TEST: Primary | ICD-10-CM

## 2021-08-10 PROCEDURE — 86580 TB INTRADERMAL TEST: CPT | Performed by: NURSE PRACTITIONER

## 2022-01-24 ENCOUNTER — OFFICE VISIT (OUTPATIENT)
Dept: FAMILY MEDICINE CLINIC | Age: 62
End: 2022-01-24
Payer: COMMERCIAL

## 2022-01-24 VITALS
HEIGHT: 59 IN | DIASTOLIC BLOOD PRESSURE: 60 MMHG | TEMPERATURE: 97.3 F | BODY MASS INDEX: 24.68 KG/M2 | HEART RATE: 79 BPM | OXYGEN SATURATION: 98 % | WEIGHT: 122.4 LBS | SYSTOLIC BLOOD PRESSURE: 124 MMHG

## 2022-01-24 DIAGNOSIS — R10.9 ACUTE RIGHT FLANK PAIN: Primary | ICD-10-CM

## 2022-01-24 DIAGNOSIS — R30.0 DYSURIA: ICD-10-CM

## 2022-01-24 DIAGNOSIS — R10.9 ACUTE RIGHT FLANK PAIN: ICD-10-CM

## 2022-01-24 LAB
ALBUMIN SERPL-MCNC: 4.6 G/DL (ref 3.5–5.2)
ALP BLD-CCNC: 82 U/L (ref 35–104)
ALT SERPL-CCNC: 16 U/L (ref 5–33)
ANION GAP SERPL CALCULATED.3IONS-SCNC: 16 MMOL/L (ref 7–19)
AST SERPL-CCNC: 17 U/L (ref 5–32)
BACTERIA: NEGATIVE /HPF
BASOPHILS ABSOLUTE: 0.1 K/UL (ref 0–0.2)
BASOPHILS RELATIVE PERCENT: 0.8 % (ref 0–1)
BILIRUB SERPL-MCNC: 0.3 MG/DL (ref 0.2–1.2)
BILIRUBIN URINE: NEGATIVE
BLOOD, URINE: NEGATIVE
BUN BLDV-MCNC: 11 MG/DL (ref 8–23)
CALCIUM SERPL-MCNC: 9.6 MG/DL (ref 8.8–10.2)
CHLORIDE BLD-SCNC: 105 MMOL/L (ref 98–111)
CLARITY: ABNORMAL
CO2: 23 MMOL/L (ref 22–29)
COLOR: YELLOW
CREAT SERPL-MCNC: 0.7 MG/DL (ref 0.5–0.9)
CRYSTALS, UA: ABNORMAL /HPF
EOSINOPHILS ABSOLUTE: 0.1 K/UL (ref 0–0.6)
EOSINOPHILS RELATIVE PERCENT: 1.8 % (ref 0–5)
EPITHELIAL CELLS, UA: 3 /HPF (ref 0–5)
GFR AFRICAN AMERICAN: >59
GFR NON-AFRICAN AMERICAN: >60
GLUCOSE BLD-MCNC: 90 MG/DL (ref 74–109)
GLUCOSE URINE: NEGATIVE MG/DL
HCT VFR BLD CALC: 42.7 % (ref 37–47)
HEMOGLOBIN: 14 G/DL (ref 12–16)
HYALINE CASTS: 1 /HPF (ref 0–8)
IMMATURE GRANULOCYTES #: 0 K/UL
KETONES, URINE: NEGATIVE MG/DL
LEUKOCYTE ESTERASE, URINE: ABNORMAL
LYMPHOCYTES ABSOLUTE: 1.9 K/UL (ref 1.1–4.5)
LYMPHOCYTES RELATIVE PERCENT: 31.1 % (ref 20–40)
MCH RBC QN AUTO: 30.1 PG (ref 27–31)
MCHC RBC AUTO-ENTMCNC: 32.8 G/DL (ref 33–37)
MCV RBC AUTO: 91.8 FL (ref 81–99)
MONOCYTES ABSOLUTE: 0.5 K/UL (ref 0–0.9)
MONOCYTES RELATIVE PERCENT: 7.5 % (ref 0–10)
NEUTROPHILS ABSOLUTE: 3.6 K/UL (ref 1.5–7.5)
NEUTROPHILS RELATIVE PERCENT: 58.6 % (ref 50–65)
NITRITE, URINE: NEGATIVE
PDW BLD-RTO: 12.4 % (ref 11.5–14.5)
PH UA: 5 (ref 5–8)
PLATELET # BLD: 250 K/UL (ref 130–400)
PMV BLD AUTO: 9.8 FL (ref 9.4–12.3)
POTASSIUM SERPL-SCNC: 4 MMOL/L (ref 3.5–5)
PROTEIN UA: NEGATIVE MG/DL
RBC # BLD: 4.65 M/UL (ref 4.2–5.4)
RBC UA: 0 /HPF (ref 0–4)
SODIUM BLD-SCNC: 144 MMOL/L (ref 136–145)
SPECIFIC GRAVITY UA: 1.01 (ref 1–1.03)
TOTAL PROTEIN: 7.3 G/DL (ref 6.6–8.7)
URINE REFLEX TO CULTURE: YES
URINE TYPE: ABNORMAL
UROBILINOGEN, URINE: 0.2 E.U./DL
WBC # BLD: 6.1 K/UL (ref 4.8–10.8)
WBC UA: 3 /HPF (ref 0–5)

## 2022-01-24 PROCEDURE — 99214 OFFICE O/P EST MOD 30 MIN: CPT | Performed by: NURSE PRACTITIONER

## 2022-01-24 RX ORDER — LEVOFLOXACIN 250 MG/1
250 TABLET ORAL DAILY
Qty: 5 TABLET | Refills: 0 | Status: SHIPPED | OUTPATIENT
Start: 2022-01-24 | End: 2022-01-29

## 2022-01-24 RX ORDER — HYDROCODONE BITARTRATE AND ACETAMINOPHEN 7.5; 325 MG/1; MG/1
1 TABLET ORAL EVERY 8 HOURS PRN
Qty: 9 TABLET | Refills: 0 | Status: SHIPPED | OUTPATIENT
Start: 2022-01-24 | End: 2022-01-27

## 2022-01-24 RX ORDER — KETOROLAC TROMETHAMINE 10 MG/1
10 TABLET, FILM COATED ORAL EVERY 6 HOURS PRN
Qty: 15 TABLET | Refills: 0 | Status: SHIPPED | OUTPATIENT
Start: 2022-01-24 | End: 2022-01-25

## 2022-01-24 RX ORDER — PHENAZOPYRIDINE HYDROCHLORIDE 200 MG/1
200 TABLET, FILM COATED ORAL 3 TIMES DAILY PRN
Qty: 12 TABLET | Refills: 0 | Status: SHIPPED | OUTPATIENT
Start: 2022-01-24 | End: 2022-01-27

## 2022-01-24 ASSESSMENT — ENCOUNTER SYMPTOMS
RESPIRATORY NEGATIVE: 1
ABDOMINAL PAIN: 1
BACK PAIN: 1

## 2022-01-24 NOTE — PROGRESS NOTES
SUBJECTIVE:    Patient ID: Michelle Kam is a57 y.o. female. Michelle Kam is here today for Urinary Tract Infection (uti symptoms started last thursday, having low back pain and some burning while urinating and urinary frequency)  . HPI:   HPI     Onset was 5d ago with pain  Urinary frequency  Pain was initially bladder and now back   Pt is sitting on edge of table in office  tx-cranberry juice did help initially  Pt does report having temp up to 100F    Has had h/o kidney stone in 1990s. Orders Only on 01/24/2022   Component Date Value Ref Range Status    Color, UA 01/24/2022 Yellow  Straw/Yellow Final    Clarity, UA 01/24/2022 SL CLOUDY* Clear Final    Glucose, Ur 01/24/2022 Negative  Negative mg/dL Final    Bilirubin Urine 01/24/2022 Negative  Negative Final    Ketones, Urine 01/24/2022 Negative  Negative mg/dL Final    Specific Gravity, UA 01/24/2022 1.010  1.005 - 1.030 Final    Blood, Urine 01/24/2022 Negative  Negative Final    pH, UA 01/24/2022 5.0  5.0 - 8.0 Final    Protein, UA 01/24/2022 Negative  Negative mg/dL Final    Urobilinogen, Urine 01/24/2022 0.2  <2.0 E.U./dL Final    Nitrite, Urine 01/24/2022 Negative  Negative Final    Leukocyte Esterase, Urine 01/24/2022 TRACE* Negative Final    Urine Type 01/24/2022 Clean catch   Final    Urine Reflex to Culture 01/24/2022 yes   Final       Urine is not showing any blood. Patient and I have discussed my concern that this may be getting into more of a kidney infection instead of bladder infection. She is in pretty intense pain at the time. We will be moving forward with a CT abdomen pelvis. I will have to get her some pain control for the next 24 hours until the antibiotic begins to improve symptoms. Past Medical History:   Diagnosis Date    Osteopenia after menopause 2017     Prior to Visit Medications    Medication Sig Taking?  Authorizing Provider   levoFLOXacin (LEVAQUIN) 250 MG tablet Take 1 tablet by mouth daily for 5 days Yes Lynda Navas APRN   phenazopyridine (PYRIDIUM) 200 MG tablet Take 1 tablet by mouth 3 times daily as needed for Pain Yes CASI Knox   ketorolac (TORADOL) 10 MG tablet Take 1 tablet by mouth every 6 hours as needed for Pain Yes Lynda Navas APRN   HYDROcodone-acetaminophen (NORCO) 7.5-325 MG per tablet Take 1 tablet by mouth every 8 hours as needed for Pain for up to 3 days. Intended supply: 3 days. Take lowest dose possible to manage pain Yes CASI Knox   omeprazole (PRILOSEC) 20 MG delayed release capsule TAKE ONE CAPSULE BY MOUTH EVERY MORNING Yes CASI Knox   diclofenac sodium (VOLTAREN) 1 % GEL Apply 4 g topically 4 times daily as needed for Pain Yes CASI Knox   calcium carbonate (OSCAL) 500 MG TABS tablet Take 500 mg by mouth daily Yes Historical Provider, MD   Multiple Vitamins-Minerals (THERAPEUTIC MULTIVITAMIN-MINERALS) tablet Take 1 tablet by mouth daily. Yes Historical Provider, MD     Allergies   Allergen Reactions    Erythromycin Anaphylaxis    Pcn [Penicillins] Anaphylaxis    Sulfa Antibiotics Anaphylaxis    Butorphanol      Other reaction(s): Hallucinations     Past Surgical History:   Procedure Laterality Date    APPENDECTOMY      COLON SURGERY      INTRAOCULAR LENS PROSTHESIS INSERTION Bilateral 01/01/2014    RUSSEL AND BSO       History reviewed. No pertinent family history.   Social History     Socioeconomic History    Marital status:      Spouse name: Not on file    Number of children: Not on file    Years of education: Not on file    Highest education level: Not on file   Occupational History    Not on file   Tobacco Use    Smoking status: Never Smoker    Smokeless tobacco: Never Used   Substance and Sexual Activity    Alcohol use: Not on file    Drug use: Not on file    Sexual activity: Not on file   Other Topics Concern    Not on file   Social History Narrative    Not on file     Social Determinants of Health     Financial Resource Strain: Low Risk     Difficulty of Paying Living Expenses: Not hard at all   Food Insecurity: No Food Insecurity    Worried About Running Out of Food in the Last Year: Never true    Raul of Food in the Last Year: Never true   Transportation Needs: No Transportation Needs    Lack of Transportation (Medical): No    Lack of Transportation (Non-Medical): No   Physical Activity:     Days of Exercise per Week: Not on file    Minutes of Exercise per Session: Not on file   Stress:     Feeling of Stress : Not on file   Social Connections:     Frequency of Communication with Friends and Family: Not on file    Frequency of Social Gatherings with Friends and Family: Not on file    Attends Latter day Services: Not on file    Active Member of [address] [place] [address] or Organizations: Not on file    Attends Club or Organization Meetings: Not on file    Marital Status: Not on file   Intimate Partner Violence:     Fear of Current or Ex-Partner: Not on file    Emotionally Abused: Not on file    Physically Abused: Not on file    Sexually Abused: Not on file   Housing Stability:     Unable to Pay for Housing in the Last Year: Not on file    Number of [place] in the Last Year: Not on file    Unstable Housing in the Last Year: Not on file       Review of Systems   Constitutional: Positive for fever. Respiratory: Negative. Cardiovascular: Negative. Gastrointestinal: Positive for abdominal pain. Genitourinary: Positive for dysuria and frequency. Negative for difficulty urinating. Musculoskeletal: Positive for back pain. OBJECTIVE:    Physical Exam  Vitals and nursing note reviewed. Constitutional:       Appearance: She is well-developed. HENT:      Head: Normocephalic. Eyes:      Conjunctiva/sclera: Conjunctivae normal.      Pupils: Pupils are equal, round, and reactive to light. Cardiovascular:      Rate and Rhythm: Normal rate and regular rhythm.    Pulmonary:      Effort: Pulmonary effort is normal. No respiratory distress. Breath sounds: Normal breath sounds. Abdominal:      General: There is no distension. Palpations: Abdomen is soft. Tenderness: There is abdominal tenderness (suprapubic and right lower). There is right CVA tenderness. Musculoskeletal:      Cervical back: Neck supple. Skin:     General: Skin is warm and dry. Capillary Refill: Capillary refill takes less than 2 seconds. Neurological:      General: No focal deficit present. Mental Status: She is alert and oriented to person, place, and time. Mental status is at baseline. Psychiatric:         Mood and Affect: Mood normal.         Behavior: Behavior normal.         Thought Content: Thought content normal.         Judgment: Judgment normal.         /60 (Site: Left Upper Arm, Position: Sitting, Cuff Size: Large Adult)   Pulse 79   Temp 97.3 °F (36.3 °C) (Temporal)   Ht 4' 11\" (1.499 m)   Wt 122 lb 6.4 oz (55.5 kg)   SpO2 98%   BMI 24.72 kg/m²      ASSESSMENT:      ICD-10-CM    1. Acute right flank pain  R10.9 CT ABDOMEN PELVIS WO CONTRAST Additional Contrast? Radiologist Recommendation     CBC Auto Differential     Comprehensive Metabolic Panel     levoFLOXacin (LEVAQUIN) 250 MG tablet     phenazopyridine (PYRIDIUM) 200 MG tablet     ketorolac (TORADOL) 10 MG tablet     HYDROcodone-acetaminophen (NORCO) 7.5-325 MG per tablet   2. Dysuria  R30.0 Urinalysis Reflex to Culture     Culture, Urine       PLAN:    Jerrell [place]: Urinary Tract Infection (uti symptoms started last thursday, having low back pain and some burning while urinating and urinary frequency)  Stat UA then back to room. Start tx plan today  CT ordered r/t intense flank pain  Lab today  ER if unable to urinate. Urine strainer. Increase water. DIEGO    Diagnosis and orders for this visit are above. Please note that this chart was generated using dragon dictationsoftware.   Although every effort was made to ensure the accuracy of this automated transcription, some errors in transcription may have occurred.

## 2022-01-25 ENCOUNTER — APPOINTMENT (OUTPATIENT)
Dept: CT IMAGING | Age: 62
End: 2022-01-25
Payer: COMMERCIAL

## 2022-01-25 ENCOUNTER — HOSPITAL ENCOUNTER (EMERGENCY)
Age: 62
Discharge: HOME | End: 2022-01-25
Payer: COMMERCIAL

## 2022-01-25 VITALS
TEMPERATURE: 98.8 F | SYSTOLIC BLOOD PRESSURE: 115 MMHG | DIASTOLIC BLOOD PRESSURE: 89 MMHG | RESPIRATION RATE: 17 BRPM | OXYGEN SATURATION: 96 % | HEART RATE: 78 BPM

## 2022-01-25 DIAGNOSIS — N10 ACUTE PYELONEPHRITIS: Primary | ICD-10-CM

## 2022-01-25 LAB
ALBUMIN SERPL-MCNC: 4.6 G/DL (ref 3.5–5.2)
ALP BLD-CCNC: 75 U/L (ref 35–104)
ALT SERPL-CCNC: 14 U/L (ref 5–33)
ANION GAP SERPL CALCULATED.3IONS-SCNC: 13 MMOL/L (ref 7–19)
AST SERPL-CCNC: 18 U/L (ref 5–32)
BACTERIA: ABNORMAL /HPF
BASOPHILS ABSOLUTE: 0 K/UL (ref 0–0.2)
BASOPHILS RELATIVE PERCENT: 0.9 % (ref 0–1)
BILIRUB SERPL-MCNC: 0.4 MG/DL (ref 0.2–1.2)
BILIRUBIN URINE: ABNORMAL
BLOOD, URINE: NEGATIVE
BUN BLDV-MCNC: 11 MG/DL (ref 8–23)
CALCIUM SERPL-MCNC: 9.4 MG/DL (ref 8.8–10.2)
CHLORIDE BLD-SCNC: 101 MMOL/L (ref 98–111)
CLARITY: ABNORMAL
CO2: 26 MMOL/L (ref 22–29)
COLOR: ABNORMAL
CREAT SERPL-MCNC: 0.7 MG/DL (ref 0.5–0.9)
EOSINOPHILS ABSOLUTE: 0.1 K/UL (ref 0–0.6)
EOSINOPHILS RELATIVE PERCENT: 1.7 % (ref 0–5)
EPITHELIAL CELLS, UA: 1 /HPF (ref 0–5)
GFR AFRICAN AMERICAN: >59
GFR NON-AFRICAN AMERICAN: >60
GLUCOSE BLD-MCNC: 94 MG/DL (ref 74–109)
GLUCOSE URINE: NEGATIVE MG/DL
HCT VFR BLD CALC: 41.9 % (ref 37–47)
HEMOGLOBIN: 13.6 G/DL (ref 12–16)
HYALINE CASTS: 1 /HPF (ref 0–8)
IMMATURE GRANULOCYTES #: 0 K/UL
KETONES, URINE: NEGATIVE MG/DL
LEUKOCYTE ESTERASE, URINE: ABNORMAL
LYMPHOCYTES ABSOLUTE: 2 K/UL (ref 1.1–4.5)
LYMPHOCYTES RELATIVE PERCENT: 42.4 % (ref 20–40)
MCH RBC QN AUTO: 29.9 PG (ref 27–31)
MCHC RBC AUTO-ENTMCNC: 32.5 G/DL (ref 33–37)
MCV RBC AUTO: 92.1 FL (ref 81–99)
MONOCYTES ABSOLUTE: 0.4 K/UL (ref 0–0.9)
MONOCYTES RELATIVE PERCENT: 8 % (ref 0–10)
NEUTROPHILS ABSOLUTE: 2.2 K/UL (ref 1.5–7.5)
NEUTROPHILS RELATIVE PERCENT: 46.8 % (ref 50–65)
NITRITE, URINE: POSITIVE
PDW BLD-RTO: 12.3 % (ref 11.5–14.5)
PH UA: 5.5 (ref 5–8)
PLATELET # BLD: 212 K/UL (ref 130–400)
PMV BLD AUTO: 9 FL (ref 9.4–12.3)
POTASSIUM REFLEX MAGNESIUM: 4 MMOL/L (ref 3.5–5)
PROTEIN UA: NEGATIVE MG/DL
RBC # BLD: 4.55 M/UL (ref 4.2–5.4)
RBC UA: 0 /HPF (ref 0–4)
SODIUM BLD-SCNC: 140 MMOL/L (ref 136–145)
SPECIFIC GRAVITY UA: 1.01 (ref 1–1.03)
TOTAL PROTEIN: 7.2 G/DL (ref 6.6–8.7)
UROBILINOGEN, URINE: 1 E.U./DL
WBC # BLD: 4.6 K/UL (ref 4.8–10.8)
WBC UA: 1 /HPF (ref 0–5)

## 2022-01-25 PROCEDURE — 36415 COLL VENOUS BLD VENIPUNCTURE: CPT

## 2022-01-25 PROCEDURE — 2580000003 HC RX 258: Performed by: PHYSICIAN ASSISTANT

## 2022-01-25 PROCEDURE — 96374 THER/PROPH/DIAG INJ IV PUSH: CPT

## 2022-01-25 PROCEDURE — 6360000002 HC RX W HCPCS: Performed by: PHYSICIAN ASSISTANT

## 2022-01-25 PROCEDURE — 99283 EMERGENCY DEPT VISIT LOW MDM: CPT

## 2022-01-25 PROCEDURE — 85025 COMPLETE CBC W/AUTO DIFF WBC: CPT

## 2022-01-25 PROCEDURE — 81001 URINALYSIS AUTO W/SCOPE: CPT

## 2022-01-25 PROCEDURE — 96375 TX/PRO/DX INJ NEW DRUG ADDON: CPT

## 2022-01-25 PROCEDURE — 80053 COMPREHEN METABOLIC PANEL: CPT

## 2022-01-25 PROCEDURE — 74150 CT ABDOMEN W/O CONTRAST: CPT

## 2022-01-25 RX ORDER — 0.9 % SODIUM CHLORIDE 0.9 %
500 INTRAVENOUS SOLUTION INTRAVENOUS ONCE
Status: COMPLETED | OUTPATIENT
Start: 2022-01-25 | End: 2022-01-25

## 2022-01-25 RX ORDER — LEVOFLOXACIN 5 MG/ML
750 INJECTION, SOLUTION INTRAVENOUS ONCE
Status: COMPLETED | OUTPATIENT
Start: 2022-01-25 | End: 2022-01-25

## 2022-01-25 RX ORDER — KETOROLAC TROMETHAMINE 10 MG/1
10 TABLET, FILM COATED ORAL EVERY 6 HOURS PRN
Qty: 20 TABLET | Refills: 0 | Status: SHIPPED | OUTPATIENT
Start: 2022-01-25

## 2022-01-25 RX ORDER — KETOROLAC TROMETHAMINE 30 MG/ML
30 INJECTION, SOLUTION INTRAMUSCULAR; INTRAVENOUS ONCE
Status: COMPLETED | OUTPATIENT
Start: 2022-01-25 | End: 2022-01-25

## 2022-01-25 RX ADMIN — LEVOFLOXACIN 750 MG: 5 INJECTION, SOLUTION INTRAVENOUS at 19:33

## 2022-01-25 RX ADMIN — KETOROLAC TROMETHAMINE 30 MG: 30 INJECTION, SOLUTION INTRAMUSCULAR; INTRAVENOUS at 19:29

## 2022-01-25 RX ADMIN — SODIUM CHLORIDE 500 ML: 9 INJECTION, SOLUTION INTRAVENOUS at 18:51

## 2022-01-25 ASSESSMENT — ENCOUNTER SYMPTOMS
EYE DISCHARGE: 0
ABDOMINAL DISTENTION: 0
RHINORRHEA: 0
NAUSEA: 0
APNEA: 0
SORE THROAT: 0
BACK PAIN: 0
EYE PAIN: 0
COUGH: 0
COLOR CHANGE: 0
SHORTNESS OF BREATH: 0
ABDOMINAL PAIN: 0
PHOTOPHOBIA: 0

## 2022-01-25 ASSESSMENT — PAIN SCALES - GENERAL: PAINLEVEL_OUTOF10: 8

## 2022-01-26 LAB — URINE CULTURE, ROUTINE: NORMAL

## 2022-01-26 NOTE — ED PROVIDER NOTES
[facility] EMERGENCY DEPT  eMERGENCYdEPARTMENT eNCOUnter      Pt Name: Anival Dixon  MRN: [id number]  [place] 1960  Date of evaluation: 1/25/2022  Provider:KAYCEE Reyes    CHIEF COMPLAINT       Chief Complaint   Patient presents with    Urinary Tract Infection     Given levoquin at pcp for urinary tract infection that they believe has gone to kidneys. Pt states she is not improving after one day of antibiotics. Afebrile at this time         HISTORY OF PRESENT ILLNESS  (Location/Symptom, Timing/Onset, Context/Setting, Quality, Duration, Modifying Factors, Severity.)   Anival Dixon is a 57 y.o. female who presents to the emergency department with complaints of flank pain bilateral worsening. She has presumed UTI has had two tablets of levaquin. Afebrile here. No nausea or vomiting. HPI    Nursing Notes were reviewed and I agree. REVIEW OF SYSTEMS    (2-9 systems for level 4, 10 or more for level 5)     Review of Systems   Constitutional: Positive for chills. Negative for activity change, appetite change and fever. HENT: Negative for congestion, postnasal drip, rhinorrhea and sore throat. Eyes: Negative for photophobia, pain, discharge and visual disturbance. Respiratory: Negative for apnea, cough and shortness of breath. Cardiovascular: Negative for chest pain and leg swelling. Gastrointestinal: Negative for abdominal distention, abdominal pain and nausea. Genitourinary: Positive for dysuria and flank pain. Negative for vaginal bleeding. Musculoskeletal: Negative for arthralgias, back pain, joint swelling, neck pain and neck stiffness. Skin: Negative for color change and rash. Neurological: Negative for dizziness, syncope, facial asymmetry and headaches. Hematological: Negative for adenopathy. Does not bruise/bleed easily. Psychiatric/Behavioral: Negative for agitation, behavioral problems and confusion.         Except as noted above the remainder of the review of systems was reviewed and negative. PAST MEDICAL HISTORY     Past Medical History:   Diagnosis Date    Osteopenia after menopause 2017         SURGICAL HISTORY       Past Surgical History:   Procedure Laterality Date    APPENDECTOMY      COLON SURGERY      INTRAOCULAR LENS PROSTHESIS INSERTION Bilateral 01/01/2014    RUSSEL AND BSO           CURRENT MEDICATIONS       Discharge Medication List as of 1/25/2022  9:04 PM      CONTINUE these medications which have NOT CHANGED    Details   levoFLOXacin (LEVAQUIN) 250 MG tablet Take 1 tablet by mouth daily for 5 days, Disp-5 tablet, R-0Normal      phenazopyridine (PYRIDIUM) 200 MG tablet Take 1 tablet by mouth 3 times daily as needed for Pain, Disp-12 tablet, R-0Normal      HYDROcodone-acetaminophen (NORCO) 7.5-325 MG per tablet Take 1 tablet by mouth every 8 hours as needed for Pain for up to 3 days. Intended supply: 3 days. Take lowest dose possible to manage pain, Disp-9 tablet, R-0Normal      omeprazole (PRILOSEC) 20 MG delayed release capsule TAKE ONE CAPSULE BY MOUTH EVERY MORNING, Disp-90 capsule, R-3This prescription was filled on 12/22/2020. Any refills authorized will be placed on file. Normal      diclofenac sodium (VOLTAREN) 1 % GEL Apply 4 g topically 4 times daily as needed for Pain, Topical, 4 TIMES DAILY PRN Starting Fri 7/30/2021, Disp-150 g, R-5, Normal      calcium carbonate (OSCAL) 500 MG TABS tablet Take 500 mg by mouth daily      Multiple Vitamins-Minerals (THERAPEUTIC MULTIVITAMIN-MINERALS) tablet Take 1 tablet by mouth daily. ALLERGIES     Erythromycin, Pcn [penicillins], Sulfa antibiotics, and Butorphanol    FAMILY HISTORY     History reviewed. No pertinent family history.        SOCIAL HISTORY       Social History     Socioeconomic History    Marital status:      Spouse name: None    Number of children: None    Years of education: None    Highest education level: None   Occupational History    None   Tobacco Use    Smoking status: Never Smoker    Smokeless tobacco: Never Used   Substance and Sexual Activity    Alcohol use: None    Drug use: None    Sexual activity: None   Other Topics Concern    None   Social History Narrative    None     Social Determinants of Health     Financial Resource Strain: Low Risk     Difficulty of Paying Living Expenses: Not hard at all   Food Insecurity: No Food Insecurity    Worried About Running Out of Food in the Last Year: Never true    Raul of Food in the Last Year: Never true   Transportation Needs: No Transportation Needs    Lack of Transportation (Medical): No    Lack of Transportation (Non-Medical): No   Physical Activity:     Days of Exercise per Week: Not on file    Minutes of Exercise per Session: Not on file   Stress:     Feeling of Stress : Not on file   Social Connections:     Frequency of Communication with Friends and Family: Not on file    Frequency of Social Gatherings with Friends and Family: Not on file    Attends Congregational Services: Not on file    Active Member of [address] [place] [address] or Organizations: Not on file    Attends Club or Organization Meetings: Not on file    Marital Status: Not on file   Intimate Partner Violence:     Fear of Current or Ex-Partner: Not on file    Emotionally Abused: Not on file    Physically Abused: Not on file    Sexually Abused: Not on file   Housing Stability:     Unable to Pay for Housing in the Last Year: Not on file    Number of [place] in the Last Year: Not on file    Unstable Housing in the Last Year: Not on file       SCREENINGS           PHYSICAL EXAM    (up to 7 forlevel 4, 8 or more for level 5)     ED Triage Vitals [01/25/22 1800]   BP Temp Temp Source Pulse Resp SpO2 Height Weight   (!) 109/92 98.8 °F (37.1 °C) Oral 75 18 95 % -- --       Physical Exam  Vitals and nursing note reviewed. Constitutional:       General: She is not in acute distress. Appearance: Normal appearance. She is well-developed. She is not diaphoretic.    HENT: Head: Normocephalic and atraumatic. Right Ear: Tympanic membrane, ear canal and external ear normal.      Left Ear: Tympanic membrane, ear canal and external ear normal.      Nose: Nose normal.      Mouth/Throat:      Mouth: Mucous membranes are moist.      Pharynx: No oropharyngeal exudate. Eyes:      General:         Right eye: No discharge. Left eye: No discharge. Pupils: Pupils are equal, round, and reactive to light. Neck:      Thyroid: No thyromegaly. Cardiovascular:      Rate and Rhythm: Normal rate and regular rhythm. Pulses: Normal pulses. Heart sounds: Normal heart sounds. No murmur heard. No friction rub. Pulmonary:      Effort: Pulmonary effort is normal. No respiratory distress. Breath sounds: Normal breath sounds. No stridor. No wheezing. Abdominal:      General: Abdomen is flat. Bowel sounds are normal. There is no distension. Palpations: Abdomen is soft. Tenderness: There is no abdominal tenderness. Musculoskeletal:         General: Normal range of motion. Cervical back: Normal range of motion and neck supple. Skin:     General: Skin is warm and dry. Capillary Refill: Capillary refill takes less than 2 seconds. Findings: No rash. Neurological:      General: No focal deficit present. Mental Status: She is alert and oriented to person, place, and time. Cranial Nerves: No cranial nerve deficit. Sensory: No sensory deficit. Coordination: Coordination normal.   Psychiatric:         Mood and Affect: Mood normal.         Behavior: Behavior normal.         Thought Content:  Thought content normal.         Judgment: Judgment normal.           DIAGNOSTIC RESULTS     RADIOLOGY:   Non-plain film images such as CT, Ultrasound and MRI are read by the radiologist. Plain radiographic images are visualized and preliminarilyinterpreted by No att. providers found with the below findings:      Interpretation per the Radiologist below, if available at the time of this note:    CT KIDNEY WO CONTRAST   Final Result   1. Moderate wall thickening or bladder. This is consistent with   cystitis. 5 colonic diverticula stenosis, without evidence acute   ventriculitis. 2.  Nonobstructive left nephrolithiasis. Signed by Dr Terry Vitale:  Labs Reviewed   URINE RT REFLEX TO CULTURE - Abnormal; Notable for the following components:       Result Value    Color, UA ORANGE (*)     Clarity, UA CLOUDY (*)     Bilirubin Urine SMALL (*)     Nitrite, Urine POSITIVE (*)     Leukocyte Esterase, Urine SMALL (*)     All other components within normal limits   CBC WITH AUTO DIFFERENTIAL - Abnormal; Notable for the following components:    WBC 4.6 (*)     MCHC 32.5 (*)     MPV 9.0 (*)     Neutrophils % 46.8 (*)     Lymphocytes % 42.4 (*)     All other components within normal limits   MICROSCOPIC URINALYSIS - Abnormal; Notable for the following components:    Bacteria, UA None Seen (*)     All other components within normal limits   COMPREHENSIVE METABOLIC PANEL W/ REFLEX TO MG FOR LOW K       All other labs were within normal range or notreturned as of this dictation. RE-ASSESSMENT        EMERGENCY DEPARTMENT COURSE and DIFFERENTIAL DIAGNOSIS/MDM:   Vitals:    Vitals:    01/25/22 1800 01/25/22 2100   BP: (!) 109/92 115/89   Pulse: 75 78   Resp: 18 17   Temp: 98.8 °F (37.1 °C)    TempSrc: Oral    SpO2: 95% 96%       MDM  Imaging does not show any stone that is currently passing she is made aware of the stone in her left kidney her urine is unremarkable as is white count the wall thickening does support resolving cystitis she is feeling a lot better here and has Toradol to take as needed for pain passing p.o. challenge at this time normal vitals overall we did not feel this woman was appropriate for admission she does not really want to stay she understands to return promptly if things should worsen.   Right now she is started Levaquin 2 tablets worth and I given her some IV as well plan to continue on the course. PROCEDURES:    Procedures      FINAL IMPRESSION      1.  Acute pyelonephritis          DISPOSITION/PLAN   DISPOSITION Decision To Discharge 01/25/2022 09:04:18 PM      PATIENT REFERRED TO:  [facility] EMERGENCY DEPT  [facility]  [phone]    If symptoms worsen    Yecenia Miller, CASI  [facility]  [phone]    Schedule an appointment as soon as possible for a visit   repeat urine; urology referral      DISCHARGE MEDICATIONS:  Discharge Medication List as of 1/25/2022  9:04 PM          (Please note that portions of this note were completed with a voice recognition program.  Efforts were made to edit the dictations but occasionallywords are mis-transcribed.)    Perla Rodriguez [address] [place] [address]  01/26/22 [id number]

## 2022-01-27 ENCOUNTER — TELEPHONE (OUTPATIENT)
Dept: FAMILY MEDICINE CLINIC | Age: 62
End: 2022-01-27

## 2022-01-27 NOTE — TELEPHONE ENCOUNTER
Called patient to notify of urine culture results. Patient also stated she was seen in the ER yesterday and she is feeling much better.

## 2022-04-29 ENCOUNTER — OFFICE VISIT (OUTPATIENT)
Dept: FAMILY MEDICINE CLINIC | Age: 62
End: 2022-04-29
Payer: COMMERCIAL

## 2022-04-29 VITALS
TEMPERATURE: 97.7 F | OXYGEN SATURATION: 100 % | SYSTOLIC BLOOD PRESSURE: 128 MMHG | HEART RATE: 75 BPM | BODY MASS INDEX: 24.39 KG/M2 | WEIGHT: 121 LBS | RESPIRATION RATE: 20 BRPM | HEIGHT: 59 IN | DIASTOLIC BLOOD PRESSURE: 82 MMHG

## 2022-04-29 DIAGNOSIS — R22.41 KNEE MASS, RIGHT: ICD-10-CM

## 2022-04-29 DIAGNOSIS — R22.41 KNEE MASS, RIGHT: Primary | ICD-10-CM

## 2022-04-29 DIAGNOSIS — M25.561 CHRONIC PAIN OF RIGHT KNEE: ICD-10-CM

## 2022-04-29 DIAGNOSIS — G89.29 CHRONIC PAIN OF RIGHT KNEE: ICD-10-CM

## 2022-04-29 PROCEDURE — 99213 OFFICE O/P EST LOW 20 MIN: CPT | Performed by: NURSE PRACTITIONER

## 2022-04-29 ASSESSMENT — PATIENT HEALTH QUESTIONNAIRE - PHQ9
SUM OF ALL RESPONSES TO PHQ QUESTIONS 1-9: 0
SUM OF ALL RESPONSES TO PHQ QUESTIONS 1-9: 0
SUM OF ALL RESPONSES TO PHQ9 QUESTIONS 1 & 2: 0
SUM OF ALL RESPONSES TO PHQ QUESTIONS 1-9: 0
SUM OF ALL RESPONSES TO PHQ QUESTIONS 1-9: 0
1. LITTLE INTEREST OR PLEASURE IN DOING THINGS: 0
2. FEELING DOWN, DEPRESSED OR HOPELESS: 0

## 2022-04-29 ASSESSMENT — ENCOUNTER SYMPTOMS: RESPIRATORY NEGATIVE: 1

## 2022-04-29 NOTE — PROGRESS NOTES
SUBJECTIVE:    Patient ID: Socrates Carter is a57 y.o. female. Socrates Carter is here today for Knee Pain (Patient presents c/o right knee pain is getting worse and patient saw Chiropractor and she was told the pain is not from arthritis.)  . HPI:   HPI     Right knee pain  Onset was months ago  Xray imaging July 2021  Notes a knot to medial aspect of knee that is \"a little painful\"  Pain has begun to worsen, now has pain to touch of the \"knot\" and reports pain traveling from knee down to mid lower leg \"almost feels like a nerve\"  tx-topical CBD does help some    Narrative   EXAMINATION: XR KNEE RIGHT (3 VIEWS) 7/30/2021 11:49 AM   HISTORY: Chronic right knee pain. Report: 3 views of the right knee were obtained. COMPARISON: There are no correlative imaging studies for comparison. Osseous alignment is normal, no fractures identified. The joint   compartments of the knee are relatively well preserved. There is no   definite joint effusion. The soft tissues are unremarkable.       Impression   Impression: No acute osseous abnormality. Signed by Dr Christel Holbrook. Cathy       Past Medical History:   Diagnosis Date    Osteopenia after menopause 2017     Prior to Visit Medications    Medication Sig Taking? Authorizing Provider   ketorolac (TORADOL) 10 MG tablet Take 1 tablet by mouth every 6 hours as needed for Pain Yes KAYCEE Carvajal   omeprazole (PRILOSEC) 20 MG delayed release capsule TAKE ONE CAPSULE BY MOUTH EVERY MORNING Yes CASI Knox   diclofenac sodium (VOLTAREN) 1 % GEL Apply 4 g topically 4 times daily as needed for Pain Yes CASI Knox   calcium carbonate (OSCAL) 500 MG TABS tablet Take 500 mg by mouth daily Yes Historical Provider, MD   Multiple Vitamins-Minerals (THERAPEUTIC MULTIVITAMIN-MINERALS) tablet Take 1 tablet by mouth daily.  Yes Historical Provider, MD     Allergies   Allergen Reactions    Erythromycin Anaphylaxis    Pcn [Penicillins] Anaphylaxis    Sulfa Antibiotics Anaphylaxis    Butorphanol      Other reaction(s): Hallucinations     Past Surgical History:   Procedure Laterality Date    APPENDECTOMY      COLON SURGERY      INTRAOCULAR LENS PROSTHESIS INSERTION Bilateral 01/01/2014    RUSSEL AND BSO       No family history on file. Social History     Socioeconomic History    Marital status:      Spouse name: Not on file    Number of children: Not on file    Years of education: Not on file    Highest education level: Not on file   Occupational History    Not on file   Tobacco Use    Smoking status: Never Smoker    Smokeless tobacco: Never Used   Substance and Sexual Activity    Alcohol use: Not on file    Drug use: Not on file    Sexual activity: Not on file   Other Topics Concern    Not on file   Social History Narrative    Not on file     Social Determinants of Health     Financial Resource Strain: Low Risk     Difficulty of Paying Living Expenses: Not hard at all   Food Insecurity: No Food Insecurity    Worried About Running Out of Food in the Last Year: Never true    Raul of Food in the Last Year: Never true   Transportation Needs: No Transportation Needs    Lack of Transportation (Medical): No    Lack of Transportation (Non-Medical):  No   Physical Activity:     Days of Exercise per Week: Not on file    Minutes of Exercise per Session: Not on file   Stress:     Feeling of Stress : Not on file   Social Connections:     Frequency of Communication with Friends and Family: Not on file    Frequency of Social Gatherings with Friends and Family: Not on file    Attends Pentecostalism Services: Not on file    Active Member of Clubs or Organizations: Not on file    Attends Club or Organization Meetings: Not on file    Marital Status: Not on file   Intimate Partner Violence:     Fear of Current or Ex-Partner: Not on file    Emotionally Abused: Not on file    Physically Abused: Not on file    Sexually Abused: Not on file   Housing Stability:     Unable to Pay for Housing in the Last Year: Not on file    Number of Places Lived in the Last Year: Not on file    Unstable Housing in the Last Year: Not on file       Review of Systems   Respiratory: Negative. Cardiovascular: Negative. Musculoskeletal: Positive for arthralgias. Skin:        Sensation change to right lower leg at times       OBJECTIVE:    Physical Exam  Vitals and nursing note reviewed. Constitutional:       Appearance: Normal appearance. She is well-developed. HENT:      Head: Normocephalic. Eyes:      Conjunctiva/sclera: Conjunctivae normal.      Pupils: Pupils are equal, round, and reactive to light. Cardiovascular:      Rate and Rhythm: Normal rate and regular rhythm. Pulmonary:      Effort: Pulmonary effort is normal. No respiratory distress. Breath sounds: Normal breath sounds. Musculoskeletal:      Cervical back: Neck supple. No rigidity. Right knee: No swelling. Normal range of motion. Tenderness (to prominence noted medially) present. Legs:    Skin:     General: Skin is warm and dry. Capillary Refill: Capillary refill takes less than 2 seconds. Neurological:      General: No focal deficit present. Mental Status: She is alert and oriented to person, place, and time. Mental status is at baseline. Psychiatric:         Mood and Affect: Mood normal.         Behavior: Behavior normal.         Thought Content: Thought content normal.         Judgment: Judgment normal.         /82 (Site: Left Upper Arm, Position: Sitting, Cuff Size: Small Adult)   Pulse 75   Temp 97.7 °F (36.5 °C) (Temporal)   Resp 20   Ht 4' 11\" (1.499 m)   Wt 121 lb (54.9 kg)   SpO2 100%   BMI 24.44 kg/m²      ASSESSMENT:      ICD-10-CM    1. Knee mass, right  R22.41 US EXTREMITY RIGHT NON VASC LIMITED   2.  Chronic pain of right knee  M25.561 MRI KNEE RIGHT WO CONTRAST    G89.29        PLAN:    Holly Fontaine: Knee Pain (Patient presents c/o right knee pain is getting worse and patient saw Chiropractor and she was told the pain is not from arthritis.)  PRN ortho eval  U/s today if possible  Schedule MRI  Continue CBD topical OR voltaren gel   Further orders pending review of tests. Total time-20mins  Diagnosis and orders for this visit are above. Please note that this chart was generated using dragon dictationsoftware. Although every effort was made to ensure the accuracy of this automated transcription, some errors in transcription may have occurred.

## 2022-05-06 ENCOUNTER — HOSPITAL ENCOUNTER (OUTPATIENT)
Dept: MRI IMAGING | Age: 62
Discharge: HOME | End: 2022-05-06
Payer: COMMERCIAL

## 2022-05-06 DIAGNOSIS — G89.29 CHRONIC PAIN OF RIGHT KNEE: ICD-10-CM

## 2022-05-06 DIAGNOSIS — M25.561 CHRONIC PAIN OF RIGHT KNEE: ICD-10-CM

## 2022-05-06 PROCEDURE — 73721 MRI JNT OF LWR EXTRE W/O DYE: CPT

## 2022-05-17 ENCOUNTER — TELEPHONE (OUTPATIENT)
Dept: GASTROENTEROLOGY | Facility: CLINIC | Age: 62
End: 2022-05-17

## 2022-05-18 DIAGNOSIS — Z86.010 HISTORY OF COLON POLYPS: Primary | ICD-10-CM

## 2022-05-19 PROBLEM — Z86.010 HISTORY OF COLON POLYPS: Status: ACTIVE | Noted: 2022-05-19

## 2022-05-20 ENCOUNTER — TELEPHONE (OUTPATIENT)
Dept: FAMILY MEDICINE CLINIC | Age: 62
End: 2022-05-20

## 2022-05-20 NOTE — TELEPHONE ENCOUNTER
FYI - Patient called and states that she has an appointment with Dr. Leland Lino at [address] [place] [address] on 06/07

## 2022-06-14 ENCOUNTER — ANESTHESIA (OUTPATIENT)
Dept: GASTROENTEROLOGY | Facility: HOSPITAL | Age: 62
End: 2022-06-14

## 2022-06-14 ENCOUNTER — ANESTHESIA EVENT (OUTPATIENT)
Dept: GASTROENTEROLOGY | Facility: HOSPITAL | Age: 62
End: 2022-06-14

## 2022-06-14 ENCOUNTER — TELEPHONE (OUTPATIENT)
Dept: GASTROENTEROLOGY | Facility: CLINIC | Age: 62
End: 2022-06-14

## 2022-06-14 ENCOUNTER — HOSPITAL ENCOUNTER (OUTPATIENT)
Facility: HOSPITAL | Age: 62
Setting detail: HOSPITAL OUTPATIENT SURGERY
Discharge: HOME | End: 2022-06-14
Attending: INTERNAL MEDICINE | Admitting: INTERNAL MEDICINE

## 2022-06-14 VITALS
RESPIRATION RATE: 16 BRPM | HEART RATE: 65 BPM | DIASTOLIC BLOOD PRESSURE: 66 MMHG | BODY MASS INDEX: 24.8 KG/M2 | HEIGHT: 59 IN | OXYGEN SATURATION: 99 % | TEMPERATURE: 97.4 F | SYSTOLIC BLOOD PRESSURE: 104 MMHG | WEIGHT: 123 LBS

## 2022-06-14 DIAGNOSIS — Z86.010 HISTORY OF COLON POLYPS: ICD-10-CM

## 2022-06-14 PROCEDURE — 45385 COLONOSCOPY W/LESION REMOVAL: CPT | Performed by: INTERNAL MEDICINE

## 2022-06-14 PROCEDURE — 88305 TISSUE EXAM BY PATHOLOGIST: CPT | Performed by: INTERNAL MEDICINE

## 2022-06-14 PROCEDURE — 25010000002 PROPOFOL 10 MG/ML EMULSION: Performed by: NURSE ANESTHETIST, CERTIFIED REGISTERED

## 2022-06-14 RX ORDER — SODIUM CHLORIDE 9 MG/ML
500 INJECTION, SOLUTION INTRAVENOUS CONTINUOUS PRN
Status: DISCONTINUED | OUTPATIENT
Start: 2022-06-14 | End: 2022-06-14 | Stop reason: HOSPADM

## 2022-06-14 RX ORDER — LIDOCAINE HYDROCHLORIDE 10 MG/ML
0.5 INJECTION, SOLUTION EPIDURAL; INFILTRATION; INTRACAUDAL; PERINEURAL ONCE AS NEEDED
Status: DISCONTINUED | OUTPATIENT
Start: 2022-06-14 | End: 2022-06-14 | Stop reason: HOSPADM

## 2022-06-14 RX ORDER — LIDOCAINE HYDROCHLORIDE 20 MG/ML
INJECTION, SOLUTION EPIDURAL; INFILTRATION; INTRACAUDAL; PERINEURAL AS NEEDED
Status: DISCONTINUED | OUTPATIENT
Start: 2022-06-14 | End: 2022-06-14 | Stop reason: SURG

## 2022-06-14 RX ORDER — SODIUM CHLORIDE 0.9 % (FLUSH) 0.9 %
10 SYRINGE (ML) INJECTION AS NEEDED
Status: DISCONTINUED | OUTPATIENT
Start: 2022-06-14 | End: 2022-06-14 | Stop reason: HOSPADM

## 2022-06-14 RX ORDER — PROPOFOL 10 MG/ML
VIAL (ML) INTRAVENOUS AS NEEDED
Status: DISCONTINUED | OUTPATIENT
Start: 2022-06-14 | End: 2022-06-14 | Stop reason: SURG

## 2022-06-14 RX ADMIN — LIDOCAINE HYDROCHLORIDE 50 MG: 20 INJECTION, SOLUTION EPIDURAL; INFILTRATION; INTRACAUDAL; PERINEURAL at 09:32

## 2022-06-14 RX ADMIN — PROPOFOL 70 MG: 10 INJECTION, EMULSION INTRAVENOUS at 09:33

## 2022-06-14 RX ADMIN — SODIUM CHLORIDE 500 ML: 9 INJECTION, SOLUTION INTRAVENOUS at 09:09

## 2022-06-16 LAB
CYTO UR: NORMAL
LAB AP CASE REPORT: NORMAL
Lab: NORMAL
PATH REPORT.FINAL DX SPEC: NORMAL
PATH REPORT.GROSS SPEC: NORMAL

## 2022-09-15 DIAGNOSIS — K21.9 GASTROESOPHAGEAL REFLUX DISEASE WITHOUT ESOPHAGITIS: ICD-10-CM

## 2022-09-15 NOTE — TELEPHONE ENCOUNTER
Sohail Herrera called to request a refill on her medication.       Last office visit : 4/29/2022   Next office visit : Visit date not found     Requested Prescriptions     Pending Prescriptions Disp Refills    omeprazole (PRILOSEC) 20 MG delayed release capsule [Pharmacy Med Name: omeprazole 20 mg capsule,delayed release] 90 capsule 3     Sig: TAKE ONE CAPSULE BY MOUTH EVERY MORNING            Zay Escamilla, [address] [place], [place] [address]dilshad

## 2022-09-16 RX ORDER — OMEPRAZOLE 20 MG/1
CAPSULE, DELAYED RELEASE ORAL
Qty: 90 CAPSULE | Refills: 3 | Status: SHIPPED | OUTPATIENT
Start: 2022-09-16

## 2023-08-15 ENCOUNTER — OFFICE VISIT (OUTPATIENT)
Dept: PRIMARY CARE CLINIC | Age: 63
End: 2023-08-15

## 2023-08-15 DIAGNOSIS — Z23 NEED FOR TUBERCULOSIS VACCINATION: Primary | ICD-10-CM

## 2023-08-15 PROCEDURE — 99999 PR OFFICE/OUTPT VISIT,PROCEDURE ONLY: CPT | Performed by: NURSE PRACTITIONER

## 2023-08-17 ENCOUNTER — OFFICE VISIT (OUTPATIENT)
Dept: PRIMARY CARE CLINIC | Age: 63
End: 2023-08-17

## 2023-08-17 DIAGNOSIS — Z11.1 ENCOUNTER FOR PPD SKIN TEST READING: Primary | ICD-10-CM

## 2023-08-17 PROCEDURE — 99999 PR OFFICE/OUTPT VISIT,PROCEDURE ONLY: CPT | Performed by: NURSE PRACTITIONER

## 2023-10-10 DIAGNOSIS — K21.9 GASTROESOPHAGEAL REFLUX DISEASE WITHOUT ESOPHAGITIS: ICD-10-CM

## 2023-10-10 NOTE — TELEPHONE ENCOUNTER
Lianna Osullivan called to request a refill on her medication.       Last office visit : 4/29/2022   Next office visit : Visit date not found     Requested Prescriptions     Pending Prescriptions Disp Refills    omeprazole (PRILOSEC) 20 MG delayed release capsule [Pharmacy Med Name: omeprazole 20 mg capsule,delayed release] 90 capsule 3     Sig: TAKE ONE CAPSULE BY MOUTH EVERY MORNING            [place]

## 2023-10-11 RX ORDER — OMEPRAZOLE 20 MG/1
CAPSULE, DELAYED RELEASE ORAL
Qty: 30 CAPSULE | Refills: 0 | Status: SHIPPED | OUTPATIENT
Start: 2023-10-11

## 2023-12-26 DIAGNOSIS — K21.9 GASTROESOPHAGEAL REFLUX DISEASE WITHOUT ESOPHAGITIS: ICD-10-CM

## 2023-12-27 RX ORDER — OMEPRAZOLE 20 MG/1
CAPSULE, DELAYED RELEASE ORAL
Qty: 30 CAPSULE | Refills: 0 | Status: SHIPPED | OUTPATIENT
Start: 2023-12-27

## 2023-12-27 NOTE — TELEPHONE ENCOUNTER
Radha Malcolm called to request a refill on her medication.       Last office visit : 4/29/2022   Next office visit : Visit date not found     Requested Prescriptions     Pending Prescriptions Disp Refills    omeprazole (PRILOSEC) 20 MG delayed release capsule [Pharmacy Med Name: omeprazole 20 mg capsule,delayed release] 30 capsule 0     Sig: TAKE ONE CAPSULE BY MOUTH EVERY MORNING            Evelin Michaels MA

## 2024-01-24 ASSESSMENT — PATIENT HEALTH QUESTIONNAIRE - PHQ9
SUM OF ALL RESPONSES TO PHQ QUESTIONS 1-9: 0
SUM OF ALL RESPONSES TO PHQ9 QUESTIONS 1 & 2: 0
SUM OF ALL RESPONSES TO PHQ QUESTIONS 1-9: 0
1. LITTLE INTEREST OR PLEASURE IN DOING THINGS: NOT AT ALL
2. FEELING DOWN, DEPRESSED OR HOPELESS: 0
SUM OF ALL RESPONSES TO PHQ9 QUESTIONS 1 & 2: 0
2. FEELING DOWN, DEPRESSED OR HOPELESS: NOT AT ALL
SUM OF ALL RESPONSES TO PHQ QUESTIONS 1-9: 0
SUM OF ALL RESPONSES TO PHQ QUESTIONS 1-9: 0
1. LITTLE INTEREST OR PLEASURE IN DOING THINGS: 0

## 2024-01-26 ENCOUNTER — OFFICE VISIT (OUTPATIENT)
Dept: FAMILY MEDICINE CLINIC | Age: 64
End: 2024-01-26
Payer: COMMERCIAL

## 2024-01-26 VITALS
OXYGEN SATURATION: 98 % | HEART RATE: 82 BPM | RESPIRATION RATE: 20 BRPM | HEIGHT: 59 IN | BODY MASS INDEX: 25 KG/M2 | SYSTOLIC BLOOD PRESSURE: 102 MMHG | TEMPERATURE: 97 F | DIASTOLIC BLOOD PRESSURE: 62 MMHG | WEIGHT: 124 LBS

## 2024-01-26 DIAGNOSIS — Z86.2 HISTORY OF ANEMIA: ICD-10-CM

## 2024-01-26 DIAGNOSIS — Z00.00 ANNUAL PHYSICAL EXAM: Primary | ICD-10-CM

## 2024-01-26 DIAGNOSIS — Z12.11 SCREEN FOR COLON CANCER: ICD-10-CM

## 2024-01-26 DIAGNOSIS — Z78.0 MENOPAUSE: ICD-10-CM

## 2024-01-26 DIAGNOSIS — Z00.00 ANNUAL PHYSICAL EXAM: ICD-10-CM

## 2024-01-26 DIAGNOSIS — Z12.31 SCREENING MAMMOGRAM, ENCOUNTER FOR: ICD-10-CM

## 2024-01-26 LAB
25(OH)D3 SERPL-MCNC: 41.9 NG/ML
ACANTHOCYTES BLD QL SMEAR: ABNORMAL
ALBUMIN SERPL-MCNC: 4.8 G/DL (ref 3.5–5.2)
ALP SERPL-CCNC: 86 U/L (ref 35–104)
ALT SERPL-CCNC: 30 U/L (ref 5–33)
ANION GAP SERPL CALCULATED.3IONS-SCNC: 16 MMOL/L (ref 7–19)
ANISOCYTOSIS BLD QL SMEAR: ABNORMAL
AST SERPL-CCNC: 25 U/L (ref 5–32)
BACTERIA URNS QL MICRO: ABNORMAL /HPF
BASOPHILS # BLD: 0.1 K/UL (ref 0–0.2)
BASOPHILS NFR BLD: 1 % (ref 0–1)
BILIRUB SERPL-MCNC: 0.3 MG/DL (ref 0.2–1.2)
BILIRUB UR QL STRIP: NEGATIVE
BUN SERPL-MCNC: 14 MG/DL (ref 8–23)
BURR CELLS BLD QL SMEAR: ABNORMAL
CALCIUM SERPL-MCNC: 10 MG/DL (ref 8.8–10.2)
CHLORIDE SERPL-SCNC: 103 MMOL/L (ref 98–111)
CHOLEST SERPL-MCNC: 218 MG/DL (ref 160–199)
CLARITY UR: CLEAR
CO2 SERPL-SCNC: 22 MMOL/L (ref 22–29)
COLOR UR: YELLOW
CREAT SERPL-MCNC: 0.7 MG/DL (ref 0.5–0.9)
CRYSTALS URNS MICRO: ABNORMAL /HPF
DACRYOCYTES BLD QL SMEAR: ABNORMAL
EOSINOPHIL # BLD: 0.1 K/UL (ref 0–0.6)
EOSINOPHIL NFR BLD: 2.9 % (ref 0–5)
EPI CELLS #/AREA URNS AUTO: 5 /HPF (ref 0–5)
FERRITIN SERPL-MCNC: 63.3 NG/ML (ref 13–150)
FOLATE SERPL-MCNC: >20 NG/ML (ref 4.8–37.3)
GLUCOSE SERPL-MCNC: 97 MG/DL (ref 74–109)
GLUCOSE UR STRIP.AUTO-MCNC: NEGATIVE MG/DL
HCT VFR BLD AUTO: 41.3 % (ref 37–47)
HCT VFR BLD AUTO: 42.4 % (ref 37–47)
HDLC SERPL-MCNC: 49 MG/DL (ref 65–121)
HGB BLD-MCNC: 12.7 G/DL (ref 12–16)
HGB UR STRIP.AUTO-MCNC: NEGATIVE MG/L
HYPOCHROMIA BLD QL SMEAR: ABNORMAL
IMM GRANULOCYTES # BLD: 0 K/UL
IRON SATN MFR SERPL: 13 % (ref 14–50)
IRON SERPL-MCNC: 54 UG/DL (ref 37–145)
KETONES UR STRIP.AUTO-MCNC: NEGATIVE MG/DL
LDLC SERPL CALC-MCNC: 129 MG/DL
LEUKOCYTE ESTERASE UR QL STRIP.AUTO: ABNORMAL
LYMPHOCYTES # BLD: 1.5 K/UL (ref 1.1–4.5)
LYMPHOCYTES NFR BLD: 31.1 % (ref 20–40)
MCH RBC QN AUTO: 25.8 PG (ref 27–31)
MCHC RBC AUTO-ENTMCNC: 30 G/DL (ref 33–37)
MCV RBC AUTO: 86.2 FL (ref 81–99)
MONOCYTES # BLD: 0.4 K/UL (ref 0–0.9)
MONOCYTES NFR BLD: 7.2 % (ref 0–10)
NEUTROPHILS # BLD: 2.8 K/UL (ref 1.5–7.5)
NEUTS SEG NFR BLD: 57.6 % (ref 50–65)
NITRITE UR QL STRIP.AUTO: NEGATIVE
OVALOCYTES BLD QL SMEAR: ABNORMAL
PH UR STRIP.AUTO: 5.5 [PH] (ref 5–8)
PLATELET # BLD AUTO: 266 K/UL (ref 130–400)
PLATELET SLIDE REVIEW: ADEQUATE
PMV BLD AUTO: 9.7 FL (ref 9.4–12.3)
POTASSIUM SERPL-SCNC: 5 MMOL/L (ref 3.5–5)
PROT SERPL-MCNC: 7.5 G/DL (ref 6.6–8.7)
PROT UR STRIP.AUTO-MCNC: NEGATIVE MG/DL
RBC # BLD AUTO: 4.92 M/UL (ref 4.2–5.4)
RBC #/AREA URNS AUTO: 1 /HPF (ref 0–4)
RETICS # AUTO: 0.05 M/UL (ref 0.03–0.12)
RETICS # AUTO: 0.06 M/UL (ref 0.03–0.12)
RETICS/RBC NFR: 0.93 % (ref 0.5–1.5)
RETICS/RBC NFR: 1.22 % (ref 0.5–1.5)
SODIUM SERPL-SCNC: 141 MMOL/L (ref 136–145)
SP GR UR STRIP.AUTO: 1.01 (ref 1–1.03)
TIBC SERPL-MCNC: 417 UG/DL (ref 250–400)
TRIGL SERPL-MCNC: 201 MG/DL (ref 0–149)
TSH SERPL DL<=0.005 MIU/L-ACNC: 2.63 UIU/ML (ref 0.35–5.5)
UROBILINOGEN UR STRIP.AUTO-MCNC: 0.2 E.U./DL
VIT B12 SERPL-MCNC: 552 PG/ML (ref 211–946)
WBC # BLD AUTO: 4.9 K/UL (ref 4.8–10.8)
WBC #/AREA URNS AUTO: 3 /HPF (ref 0–5)

## 2024-01-26 PROCEDURE — 99396 PREV VISIT EST AGE 40-64: CPT | Performed by: NURSE PRACTITIONER

## 2024-01-26 RX ORDER — LANOLIN ALCOHOL/MO/W.PET/CERES
325 CREAM (GRAM) TOPICAL
COMMUNITY

## 2024-01-26 SDOH — ECONOMIC STABILITY: INCOME INSECURITY: HOW HARD IS IT FOR YOU TO PAY FOR THE VERY BASICS LIKE FOOD, HOUSING, MEDICAL CARE, AND HEATING?: NOT HARD AT ALL

## 2024-01-26 SDOH — ECONOMIC STABILITY: HOUSING INSECURITY
IN THE LAST 12 MONTHS, WAS THERE A TIME WHEN YOU DID NOT HAVE A STEADY PLACE TO SLEEP OR SLEPT IN A SHELTER (INCLUDING NOW)?: NO

## 2024-01-26 SDOH — ECONOMIC STABILITY: FOOD INSECURITY: WITHIN THE PAST 12 MONTHS, YOU WORRIED THAT YOUR FOOD WOULD RUN OUT BEFORE YOU GOT MONEY TO BUY MORE.: NEVER TRUE

## 2024-01-26 SDOH — ECONOMIC STABILITY: FOOD INSECURITY: WITHIN THE PAST 12 MONTHS, THE FOOD YOU BOUGHT JUST DIDN'T LAST AND YOU DIDN'T HAVE MONEY TO GET MORE.: NEVER TRUE

## 2024-01-26 ASSESSMENT — ENCOUNTER SYMPTOMS
RESPIRATORY NEGATIVE: 1
BLOOD IN STOOL: 1

## 2024-01-26 NOTE — PROGRESS NOTES
SUBJECTIVE:    Patient ID: Sujey Watson is a63 y.o. female.  Sujey Watson is here today for Annual Exam (Patient presents for yearly physical.) and Health Maintenance (Yearly mammogram needed - prefers [facility])  .    HPI:   HPI     Pt is here today for annual physical.  She states that time has gotten away in a bit more time than she intended has been since her last physical.  She has had hysterectomy, therefore Pap will not be done today.  Previous mammogram was at [facility] and that does need to be updated.  She also needs a bone density test and does agree to have it done at [facility] when going for mammogram.  She does have some concerns of  thirsty, dry skin  Has h/o blood transfusion after rectal bleeding.  She does report that she has continued to see some blood and is concerned that she needs lab.  She has had colonoscopy since this intermittent bleeding has occurred but the last one was 2017.  She was on a 5-year recall and it is overdue.  She does not nolan constipation.  She does have adequate intake of fiber daily as well as water.  She is active.  She does not strain with her bowel movements at all.  She has restarted iron supplementation with concerns of losing blood.  She does state that intermittently she has experienced what she feels is increase in heart rate when she is physically active if she has had more blood noted in bouts.  This is not occurring at rest.    She does continue to have reflux that is well-controlled with omeprazole.  She does state that there are times that she could probably go without that but has continued to use it daily.  I have suggested perhaps to go to every other day to see if symptoms are still controlled.            Past Medical History:   Diagnosis Date    Osteopenia after menopause 2017     Prior to Visit Medications    Medication Sig Taking? Authorizing Provider   ferrous sulfate (FE TABS 325) 325 (65 Fe) MG EC tablet Take 1 tablet

## 2024-01-29 DIAGNOSIS — K21.9 GASTROESOPHAGEAL REFLUX DISEASE WITHOUT ESOPHAGITIS: ICD-10-CM

## 2024-01-29 RX ORDER — OMEPRAZOLE 20 MG/1
CAPSULE, DELAYED RELEASE ORAL
Qty: 30 CAPSULE | Refills: 0 | OUTPATIENT
Start: 2024-01-29

## 2024-01-29 RX ORDER — OMEPRAZOLE 20 MG/1
20 CAPSULE, DELAYED RELEASE ORAL EVERY MORNING
Qty: 90 CAPSULE | Refills: 0 | Status: SHIPPED | OUTPATIENT
Start: 2024-01-29

## 2024-01-29 NOTE — TELEPHONE ENCOUNTER
Sujey Watson called to request a refill on her medication.      Last office visit : 1/26/2024   Next office visit : Visit date not found     Requested Prescriptions     Pending Prescriptions Disp Refills    omeprazole (PRILOSEC) 20 MG delayed release capsule 30 capsule 0     Sig: Take 1 capsule by mouth every morning            Izabela Broderick MA

## 2024-02-06 RX ORDER — LANOLIN ALCOHOL/MO/W.PET/CERES
325 CREAM (GRAM) TOPICAL DAILY
Qty: 30 TABLET | Refills: 1 | Status: SHIPPED | OUTPATIENT
Start: 2024-02-06

## 2024-02-06 NOTE — RESULT ENCOUNTER NOTE
(Michelle, if the  needs another example of PDF problem---this one is scanned that way)  Mammogram is normal.  Continue every month self breast exam and yearly mammos.

## 2024-02-21 ENCOUNTER — OFFICE VISIT (OUTPATIENT)
Dept: FAMILY MEDICINE CLINIC | Age: 64
End: 2024-02-21
Payer: COMMERCIAL

## 2024-02-21 VITALS
HEART RATE: 74 BPM | DIASTOLIC BLOOD PRESSURE: 78 MMHG | TEMPERATURE: 97.3 F | OXYGEN SATURATION: 98 % | WEIGHT: 124 LBS | HEIGHT: 59 IN | SYSTOLIC BLOOD PRESSURE: 116 MMHG | RESPIRATION RATE: 20 BRPM | BODY MASS INDEX: 25 KG/M2

## 2024-02-21 DIAGNOSIS — J04.0 LARYNGITIS: Primary | ICD-10-CM

## 2024-02-21 DIAGNOSIS — J02.9 ACUTE PHARYNGITIS, UNSPECIFIED ETIOLOGY: ICD-10-CM

## 2024-02-21 LAB
INFLUENZA A ANTIBODY: NORMAL
INFLUENZA B ANTIBODY: NEGATIVE
S PYO AG THROAT QL: NORMAL

## 2024-02-21 PROCEDURE — 99213 OFFICE O/P EST LOW 20 MIN: CPT | Performed by: NURSE PRACTITIONER

## 2024-02-21 RX ORDER — PREDNISONE 20 MG/1
20 TABLET ORAL DAILY
Qty: 7 TABLET | Refills: 0 | Status: SHIPPED | OUTPATIENT
Start: 2024-02-21 | End: 2024-02-28

## 2024-02-21 RX ORDER — DOXYCYCLINE HYCLATE 100 MG
100 TABLET ORAL 2 TIMES DAILY
Qty: 20 TABLET | Refills: 0 | Status: SHIPPED | OUTPATIENT
Start: 2024-02-21 | End: 2024-03-02

## 2024-02-21 RX ORDER — DEXTROMETHORPHAN HYDROBROMIDE AND PROMETHAZINE HYDROCHLORIDE 15; 6.25 MG/5ML; MG/5ML
5 SYRUP ORAL 4 TIMES DAILY PRN
Qty: 160 ML | Refills: 0 | Status: SHIPPED | OUTPATIENT
Start: 2024-02-21 | End: 2024-02-28

## 2024-02-21 ASSESSMENT — ENCOUNTER SYMPTOMS
SORE THROAT: 1
COUGH: 1

## 2024-02-21 NOTE — PROGRESS NOTES
(VIBRA-TABS) 100 MG tablet     promethazine-dextromethorphan (PROMETHAZINE-DM) 6.25-15 MG/5ML syrup          PLAN:    Sujey Watson: Sinus Problem (Patient presents for sinus problems since Saturday ), Cough, and Laryngitis (Patient took Emmie seltzer plus today)  Plan as associated above.  Increase fluids, rest, tylenol or ibuprofen as needed.  Follow up in 3 days if you are not feeling improvement and ASAP if worse.  Contagious precautions related to no testing for covid  Diagnosis and orders for this visit are above.      Please note that this chart was generated using dragon dictationsoftware.  Although every effort was made to ensure the accuracy of this automated transcription, some errors in transcription may have occurred.

## 2024-02-22 ENCOUNTER — OFFICE VISIT (OUTPATIENT)
Dept: GASTROENTEROLOGY | Facility: CLINIC | Age: 64
End: 2024-02-22
Payer: COMMERCIAL

## 2024-02-22 VITALS
SYSTOLIC BLOOD PRESSURE: 120 MMHG | DIASTOLIC BLOOD PRESSURE: 70 MMHG | TEMPERATURE: 98.3 F | HEIGHT: 59 IN | WEIGHT: 126 LBS | HEART RATE: 88 BPM | OXYGEN SATURATION: 99 % | BODY MASS INDEX: 25.4 KG/M2

## 2024-02-22 DIAGNOSIS — K62.5 RECTAL BLEEDING: Primary | ICD-10-CM

## 2024-02-22 RX ORDER — MESALAMINE 1000 MG/1
1000 SUPPOSITORY RECTAL NIGHTLY
Qty: 10 SUPPOSITORY | Refills: 1 | Status: SHIPPED | OUTPATIENT
Start: 2024-02-22 | End: 2024-03-13

## 2024-03-08 RX ORDER — MESALAMINE 1000 MG/1
1000 SUPPOSITORY RECTAL NIGHTLY
Qty: 10 SUPPOSITORY | Refills: 1 | OUTPATIENT
Start: 2024-03-08 | End: 2024-03-28

## 2024-04-23 DIAGNOSIS — K21.9 GASTROESOPHAGEAL REFLUX DISEASE WITHOUT ESOPHAGITIS: ICD-10-CM

## 2024-04-23 NOTE — TELEPHONE ENCOUNTER
PHARMACY called to request a refill on her medication.      Last office visit : 2/21/2024   Next office visit : Visit date not found     Requested Prescriptions     Pending Prescriptions Disp Refills    omeprazole (PRILOSEC) 20 MG delayed release capsule [Pharmacy Med Name: omeprazole 20 mg capsule,delayed release] 90 capsule 0     Sig: TAKE ONE CAPSULE BY MOUTH EVERY MORNING            Izabela Broderick MA, [facility]

## 2024-04-24 RX ORDER — OMEPRAZOLE 20 MG/1
20 CAPSULE, DELAYED RELEASE ORAL EVERY MORNING
Qty: 90 CAPSULE | Refills: 0 | Status: SHIPPED | OUTPATIENT
Start: 2024-04-24

## 2024-07-23 DIAGNOSIS — K21.9 GASTROESOPHAGEAL REFLUX DISEASE WITHOUT ESOPHAGITIS: ICD-10-CM

## 2024-07-23 RX ORDER — OMEPRAZOLE 20 MG/1
20 CAPSULE, DELAYED RELEASE ORAL EVERY MORNING
Qty: 90 CAPSULE | Refills: 0 | Status: SHIPPED | OUTPATIENT
Start: 2024-07-23

## 2024-07-23 NOTE — TELEPHONE ENCOUNTER
Sujey Watson called to request a refill on her medication.      Last office visit : 2/21/2024   Next office visit : Visit date not found     Requested Prescriptions     Pending Prescriptions Disp Refills    omeprazole (PRILOSEC) 20 MG delayed release capsule [Pharmacy Med Name: omeprazole 20 mg capsule,delayed release] 90 capsule 0     Sig: TAKE ONE CAPSULE BY MOUTH EVERY MORNING            Krista Mathews LPN

## 2024-08-26 ENCOUNTER — OFFICE VISIT (OUTPATIENT)
Dept: PRIMARY CARE CLINIC | Age: 64
End: 2024-08-26

## 2024-08-26 VITALS
OXYGEN SATURATION: 98 % | HEART RATE: 68 BPM | WEIGHT: 124.5 LBS | SYSTOLIC BLOOD PRESSURE: 128 MMHG | BODY MASS INDEX: 25.1 KG/M2 | HEIGHT: 59 IN | TEMPERATURE: 97.3 F | DIASTOLIC BLOOD PRESSURE: 84 MMHG

## 2024-08-26 DIAGNOSIS — J02.9 SORE THROAT: ICD-10-CM

## 2024-08-26 DIAGNOSIS — Z11.52 ENCOUNTER FOR SCREENING FOR COVID-19: ICD-10-CM

## 2024-08-26 DIAGNOSIS — J06.9 VIRAL URI: Primary | ICD-10-CM

## 2024-08-26 LAB
Lab: NORMAL
QC PASS/FAIL: NORMAL
S PYO AG THROAT QL: NORMAL
SARS-COV-2, POC: NORMAL

## 2024-08-26 ASSESSMENT — ENCOUNTER SYMPTOMS
SORE THROAT: 1
WHEEZING: 0
DIARRHEA: 0
EYE DISCHARGE: 0
SHORTNESS OF BREATH: 0
BLOOD IN STOOL: 0
RHINORRHEA: 0
VOMITING: 0
COUGH: 0
SINUS PRESSURE: 0
CONSTIPATION: 0
EYE ITCHING: 0
COLOR CHANGE: 0
ABDOMINAL PAIN: 0
NAUSEA: 0

## 2024-08-26 NOTE — PROGRESS NOTES
[facility]  [facility] [address]  [address]  [place] [address]  Dept: [phone]  Dept Fax: [phone]  Loc: [phone]    Sujey Watson is a 64 y.o. female who presents today for her medical conditions/complaints as noted below.  Sujey Watson is complaining of Pharyngitis (Started last night ), Headache, and Generalized Body Aches        HPI:   Pharyngitis  This is a new problem. The current episode started yesterday. The problem occurs intermittently. The problem has been waxing and waning. Associated symptoms include chills, congestion, fatigue, headaches, myalgias and a sore throat. Pertinent negatives include no abdominal pain, chest pain, coughing, fever, nausea, rash or vomiting. The symptoms are aggravated by swallowing. Treatments tried: sudafed, tylenol, aleve. The treatment provided mild relief.       Past Medical History:   Diagnosis Date    Osteopenia after menopause 2017       Past Surgical History:   Procedure Laterality Date    APPENDECTOMY      COLON SURGERY      INTRAOCULAR LENS PROSTHESIS INSERTION Bilateral 01/01/2014    RUSSEL AND BSO (CERVIX REMOVED)         History reviewed. No pertinent family history.    Social History     Tobacco Use    Smoking status: Never    Smokeless tobacco: Never   Substance Use Topics    Alcohol use: Not on file        Current Outpatient Medications   Medication Sig Dispense Refill    omeprazole (PRILOSEC) 20 MG delayed release capsule TAKE ONE CAPSULE BY MOUTH EVERY MORNING 90 capsule 0    ferrous sulfate (FE TABS 325) 325 (65 Fe) MG EC tablet Take 1 tablet by mouth daily 30 tablet 1    calcium carbonate (OSCAL) 500 MG TABS tablet Take 1 tablet by mouth daily      Multiple Vitamins-Minerals (THERAPEUTIC MULTIVITAMIN-MINERALS) tablet Take 1 tablet by mouth daily       No current facility-administered medications for this visit.       Allergies   Allergen Reactions    Erythromycin Anaphylaxis    Pcn [Penicillins]  with PCP or return to clinic if symptoms worsen/fail to improve.    Urgent Care evaluation today is not a substitute for a PCP visit. Follow up care is your responsibility to discuss and review this UC visit with your PCP.     Orders Placed This Encounter   Procedures    POCT rapid strep A    POCT COVID-19 Antigen Card     Order Specific Question:   Pregnant?     Answer:   No     Results for orders placed or performed in visit on 08/26/24   POCT rapid strep A   Result Value Ref Range    Strep A Ag None Detected None Detected   POCT COVID-19 Antigen Card   Result Value Ref Range    SARS-COV-2, POC Not-Detected Not Detected    Lot Number [id number]     QC Pass/Fail PASS      Return if symptoms worsen or fail to improve.         Discussed usage, benefits, and side effects of any administered or prescribed medications. All questions were answered regarding evaluation, diagnosis, and treatment plan done during today's visit. Patient was strongly encouraged to follow up with PCP within the next few days to be re-evaluated for improvement in symptoms or for any other questions. Return precautions for worsening of the condition or development of new concerning symptoms discussed. Patient and/or guardian was given educational information, verbalized understanding, and is in agreement with treatment plan.   Patient was given educational materials - see patient instructions below.     Patient Instructions   Recommended supportive care:  - Increase fluid intake  - Encouraged adequate rest  - Recommended OTC claritin or zyrtec and flonase  - Take OTC motrin/tylenol for fevers/body aches unless contraindicated  - Stay home until at least 24 hours fever free without medications.   - The patient is to follow up with PCP or return to clinic if symptoms worsen/fail to improve.       Electronically signed by CASI Hernandez CNP on 8/26/2024 at 10:02 AM

## 2024-10-16 DIAGNOSIS — K21.9 GASTROESOPHAGEAL REFLUX DISEASE WITHOUT ESOPHAGITIS: ICD-10-CM

## 2024-10-16 NOTE — TELEPHONE ENCOUNTER
Sujey Watson called to request a refill on her medication.      Last office visit : 2/21/2024   Next office visit : 10/28/2024     Requested Prescriptions     Pending Prescriptions Disp Refills    omeprazole (PRILOSEC) 20 MG delayed release capsule [Pharmacy Med Name: omeprazole 20 mg capsule,delayed release] 90 capsule 0     Sig: TAKE ONE CAPSULE BY MOUTH EVERY MORNING            Krista Mathews LPN

## 2024-10-24 SDOH — HEALTH STABILITY: PHYSICAL HEALTH: ON AVERAGE, HOW MANY DAYS PER WEEK DO YOU ENGAGE IN MODERATE TO STRENUOUS EXERCISE (LIKE A BRISK WALK)?: 5 DAYS

## 2024-10-24 SDOH — HEALTH STABILITY: PHYSICAL HEALTH: ON AVERAGE, HOW MANY MINUTES DO YOU ENGAGE IN EXERCISE AT THIS LEVEL?: 60 MIN

## 2024-10-24 ASSESSMENT — LIFESTYLE VARIABLES
HOW MANY STANDARD DRINKS CONTAINING ALCOHOL DO YOU HAVE ON A TYPICAL DAY: 1
HOW OFTEN DO YOU HAVE A DRINK CONTAINING ALCOHOL: 2-4 TIMES A MONTH
HOW MANY STANDARD DRINKS CONTAINING ALCOHOL DO YOU HAVE ON A TYPICAL DAY: 1 OR 2
HOW OFTEN DO YOU HAVE A DRINK CONTAINING ALCOHOL: 3
HOW OFTEN DO YOU HAVE SIX OR MORE DRINKS ON ONE OCCASION: 1

## 2024-10-24 ASSESSMENT — PATIENT HEALTH QUESTIONNAIRE - PHQ9
2. FEELING DOWN, DEPRESSED OR HOPELESS: NOT AT ALL
SUM OF ALL RESPONSES TO PHQ QUESTIONS 1-9: 0
SUM OF ALL RESPONSES TO PHQ9 QUESTIONS 1 & 2: 0
SUM OF ALL RESPONSES TO PHQ QUESTIONS 1-9: 0
SUM OF ALL RESPONSES TO PHQ QUESTIONS 1-9: 0
1. LITTLE INTEREST OR PLEASURE IN DOING THINGS: NOT AT ALL
SUM OF ALL RESPONSES TO PHQ QUESTIONS 1-9: 0

## 2024-10-28 ENCOUNTER — OFFICE VISIT (OUTPATIENT)
Dept: FAMILY MEDICINE CLINIC | Age: 64
End: 2024-10-28
Payer: MEDICARE

## 2024-10-28 VITALS
WEIGHT: 126.8 LBS | BODY MASS INDEX: 25.56 KG/M2 | OXYGEN SATURATION: 95 % | SYSTOLIC BLOOD PRESSURE: 112 MMHG | HEART RATE: 71 BPM | TEMPERATURE: 97.8 F | HEIGHT: 59 IN | DIASTOLIC BLOOD PRESSURE: 80 MMHG | RESPIRATION RATE: 14 BRPM

## 2024-10-28 DIAGNOSIS — Z00.00 WELCOME TO MEDICARE PREVENTIVE VISIT: Primary | ICD-10-CM

## 2024-10-28 DIAGNOSIS — M85.80 OSTEOPENIA, UNSPECIFIED LOCATION: ICD-10-CM

## 2024-10-28 DIAGNOSIS — Z28.21 PNEUMOCOCCAL VACCINATION DECLINED BY PATIENT: ICD-10-CM

## 2024-10-28 DIAGNOSIS — Z12.31 SCREENING MAMMOGRAM, ENCOUNTER FOR: ICD-10-CM

## 2024-10-28 DIAGNOSIS — K21.9 GASTROESOPHAGEAL REFLUX DISEASE WITHOUT ESOPHAGITIS: ICD-10-CM

## 2024-10-28 DIAGNOSIS — Z91.09 ENVIRONMENTAL ALLERGIES: ICD-10-CM

## 2024-10-28 DIAGNOSIS — Z23 NEED FOR TDAP VACCINATION: ICD-10-CM

## 2024-10-28 PROCEDURE — 3017F COLORECTAL CA SCREEN DOC REV: CPT | Performed by: NURSE PRACTITIONER

## 2024-10-28 PROCEDURE — G0402 INITIAL PREVENTIVE EXAM: HCPCS | Performed by: NURSE PRACTITIONER

## 2024-10-28 PROCEDURE — 1123F ACP DISCUSS/DSCN MKR DOCD: CPT | Performed by: NURSE PRACTITIONER

## 2024-10-28 PROCEDURE — 90715 TDAP VACCINE 7 YRS/> IM: CPT | Performed by: NURSE PRACTITIONER

## 2024-10-28 PROCEDURE — 90471 IMMUNIZATION ADMIN: CPT | Performed by: NURSE PRACTITIONER

## 2024-10-28 RX ORDER — LEVOCETIRIZINE DIHYDROCHLORIDE 5 MG/1
5 TABLET, FILM COATED ORAL NIGHTLY
COMMUNITY
Start: 2024-09-30

## 2024-10-28 NOTE — PATIENT INSTRUCTIONS
macular degeneration, and other eye disorders.  A preventive dental visit is recommended every 6 months.  Try to get at least 150 minutes of exercise per week or 10,000 steps per day on a pedometer .  Order or download the FREE \"Exercise & Physical Activity: Your Everyday Guide\" from [employer]. Call 1-283.924.9381 or search [employer] online.  You need 9454-2809 mg of calcium and 4504-9454 IU of vitamin D per day. It is possible to meet your calcium requirement with diet alone, but a vitamin D supplement is usually necessary to meet this goal.  When exposed to the sun, use a sunscreen that protects against both UVA and UVB radiation with an SPF of 30 or greater. Reapply every 2 to 3 hours or after sweating, drying off with a towel, or swimming.  Always wear a seat belt when traveling in a car. Always wear a helmet when riding a bicycle or motorcycle.

## 2024-10-28 NOTE — PROGRESS NOTES
After obtaining consent, and per orders of Lynda LANCE, injection of Tdap Boostrix given in Left deltoid by Lindsey Horan CMA. Patient instructed to remain in clinic for 20 minutes afterwards, and to report any adverse reaction to me immediately.

## 2024-10-28 NOTE — PROGRESS NOTES
Medicare Annual Wellness Visit    Sujey Watson is here for Medicare AWV (Patient is here for her medicare annual visit. )    Assessment & Plan   Welcome to Medicare preventive visit  -     Urinalysis with Reflex to Culture; Future  -     CBC with Auto Differential; Future  -     Comprehensive Metabolic Panel w/ Reflex to MG; Future  -     Lipid Panel; Future  -     Hemoglobin A1C; Future  -     TSH with Reflex to FT4; Future  -     Vitamin D 25 Hydroxy; Future  Gastroesophageal reflux disease without esophagitis  -     omeprazole (PRILOSEC) 20 MG delayed release capsule; Take 1 capsule by mouth every morning, Disp-90 capsule, R-3Normal  Pneumococcal vaccination declined by patient  Osteopenia, unspecified location  -     Vitamin D 25 Hydroxy; Future  Need for Tdap vaccination  -     Tdap, BOOSTRIX, (age 10 yrs+), IM  Screening mammogram, encounter for  -     Mammography screening bilateral; Future  Environmental allergies-continue monthly allergy injection therapy  Recommendations for Preventive Services Due: see orders and patient instructions/AVS.  Recommended screening schedule for the next 5-10 years is provided to the patient in written form: see Patient Instructions/AVS.      Fasting lab ordered  Med refill  Mammo  Tdap today  Plan as above       Return in about 1 year (around 10/28/2025).     Subjective         Rectal bleeding tx with mesalamine by Dr. Fisher only 2 supp used and bleeding stopped.     Overall, Sujey feels she is doing quite well.  She continues to live an active lifestyle.  She does work out in the yard often and has not had a Tdap vaccine in many many years and we will move forward with that while here today.  She is inquiring of pneumococcal vaccine and will be given information regarding that but she does wish to hold off on that for today.  Her vision is starting to finally improve after having complications after COVID-vaccine.      Patient's complete Health Risk Assessment and screening

## 2025-01-31 DIAGNOSIS — E78.5 HYPERLIPIDEMIA, UNSPECIFIED HYPERLIPIDEMIA TYPE: ICD-10-CM

## 2025-01-31 DIAGNOSIS — Z00.00 WELCOME TO MEDICARE PREVENTIVE VISIT: ICD-10-CM

## 2025-01-31 DIAGNOSIS — M85.80 OSTEOPENIA, UNSPECIFIED LOCATION: ICD-10-CM

## 2025-01-31 DIAGNOSIS — R79.89 ABNORMAL CBC: Primary | ICD-10-CM

## 2025-01-31 LAB
25(OH)D3 SERPL-MCNC: 51.5 NG/ML
ALBUMIN SERPL-MCNC: 4.6 G/DL (ref 3.5–5.2)
ALP SERPL-CCNC: 72 U/L (ref 35–104)
ALT SERPL-CCNC: 16 U/L (ref 5–33)
ANION GAP SERPL CALCULATED.3IONS-SCNC: 11 MMOL/L (ref 8–16)
ANISOCYTOSIS BLD QL SMEAR: ABNORMAL
AST SERPL-CCNC: 21 U/L (ref 5–32)
BACTERIA URNS QL MICRO: NEGATIVE /HPF
BASOPHILS # BLD: 0 K/UL (ref 0–0.2)
BASOPHILS NFR BLD: 0.6 % (ref 0–1)
BILIRUB SERPL-MCNC: 0.5 MG/DL (ref 0.2–1.2)
BILIRUB UR QL STRIP: NEGATIVE
BUN SERPL-MCNC: 11 MG/DL (ref 8–23)
BURR CELLS BLD QL SMEAR: ABNORMAL
CALCIUM SERPL-MCNC: 9.3 MG/DL (ref 8.8–10.2)
CHLORIDE SERPL-SCNC: 104 MMOL/L (ref 98–107)
CHOLEST SERPL-MCNC: 211 MG/DL (ref 0–199)
CLARITY UR: CLEAR
CO2 SERPL-SCNC: 26 MMOL/L (ref 22–29)
COLOR UR: YELLOW
CREAT SERPL-MCNC: 0.7 MG/DL (ref 0.5–0.9)
CRYSTALS URNS MICRO: NORMAL /HPF
DACRYOCYTES BLD QL SMEAR: ABNORMAL
EOSINOPHIL # BLD: 0.1 K/UL (ref 0–0.6)
EOSINOPHIL NFR BLD: 2.7 % (ref 0–5)
EPI CELLS #/AREA URNS AUTO: 2 /HPF (ref 0–5)
ERYTHROCYTE [DISTWIDTH] IN BLOOD BY AUTOMATED COUNT: 12.6 % (ref 11.5–14.5)
GLUCOSE SERPL-MCNC: 86 MG/DL (ref 70–99)
GLUCOSE UR STRIP.AUTO-MCNC: NEGATIVE MG/DL
HBA1C MFR BLD: 5.6 % (ref 4–5.6)
HCT VFR BLD AUTO: 40.8 % (ref 37–47)
HDLC SERPL-MCNC: 56 MG/DL (ref 40–60)
HGB BLD-MCNC: 13.6 G/DL (ref 12–16)
HGB UR STRIP.AUTO-MCNC: NEGATIVE MG/L
HYALINE CASTS #/AREA URNS AUTO: 3 /HPF (ref 0–8)
IMM GRANULOCYTES # BLD: 0 K/UL
KETONES UR STRIP.AUTO-MCNC: NEGATIVE MG/DL
LDLC SERPL CALC-MCNC: 120 MG/DL
LEUKOCYTE ESTERASE UR QL STRIP.AUTO: ABNORMAL
LYMPHOCYTES # BLD: 1.6 K/UL (ref 1.1–4.5)
LYMPHOCYTES NFR BLD: 31.2 % (ref 20–40)
MCH RBC QN AUTO: 30.2 PG (ref 27–31)
MCHC RBC AUTO-ENTMCNC: 33.3 G/DL (ref 33–37)
MCV RBC AUTO: 90.7 FL (ref 81–99)
MONOCYTES # BLD: 0.3 K/UL (ref 0–0.9)
MONOCYTES NFR BLD: 5.8 % (ref 0–10)
NEUTROPHILS # BLD: 3 K/UL (ref 1.5–7.5)
NEUTS SEG NFR BLD: 59.1 % (ref 50–65)
NITRITE UR QL STRIP.AUTO: NEGATIVE
PH UR STRIP.AUTO: 6 [PH] (ref 5–8)
PLATELET # BLD AUTO: 55 K/UL (ref 130–400)
PLATELET SLIDE REVIEW: ABNORMAL
PMV BLD AUTO: 10.1 FL (ref 9.4–12.3)
POIKILOCYTOSIS BLD QL SMEAR: ABNORMAL
POTASSIUM SERPL-SCNC: 4.7 MMOL/L (ref 3.5–5)
PROT SERPL-MCNC: 7.4 G/DL (ref 6.4–8.3)
PROT UR STRIP.AUTO-MCNC: NEGATIVE MG/DL
RBC # BLD AUTO: 4.5 M/UL (ref 4.2–5.4)
RBC #/AREA URNS AUTO: 1 /HPF (ref 0–4)
SODIUM SERPL-SCNC: 141 MMOL/L (ref 136–145)
SP GR UR STRIP.AUTO: 1 (ref 1–1.03)
STOMATOCYTES BLD QL SMEAR: ABNORMAL
TRIGL SERPL-MCNC: 175 MG/DL (ref 0–149)
UROBILINOGEN UR STRIP.AUTO-MCNC: 0.2 E.U./DL
WBC # BLD AUTO: 5.4 K/UL (ref 4.8–10.8)
WBC #/AREA URNS AUTO: 2 /HPF (ref 0–5)

## 2025-02-11 DIAGNOSIS — E78.5 HYPERLIPIDEMIA, UNSPECIFIED HYPERLIPIDEMIA TYPE: ICD-10-CM

## 2025-02-11 DIAGNOSIS — R79.89 ABNORMAL CBC: ICD-10-CM

## 2025-02-11 DIAGNOSIS — D50.9 IRON DEFICIENCY ANEMIA, UNSPECIFIED IRON DEFICIENCY ANEMIA TYPE: Primary | ICD-10-CM

## 2025-02-11 DIAGNOSIS — Z00.00 WELCOME TO MEDICARE PREVENTIVE VISIT: ICD-10-CM

## 2025-02-11 LAB
BASOPHILS # BLD: 0.1 K/UL (ref 0–0.2)
BASOPHILS NFR BLD: 1.4 % (ref 0–1)
EOSINOPHIL # BLD: 0.1 K/UL (ref 0–0.6)
EOSINOPHIL NFR BLD: 2.7 % (ref 0–5)
ERYTHROCYTE [DISTWIDTH] IN BLOOD BY AUTOMATED COUNT: 12.6 % (ref 11.5–14.5)
FERRITIN SERPL-MCNC: 11.9 NG/ML (ref 13–150)
FOLATE SERPL-MCNC: >20 NG/ML (ref 4.8–37.3)
HCT VFR BLD AUTO: 38.8 % (ref 37–47)
HGB BLD-MCNC: 12.7 G/DL (ref 12–16)
IMM GRANULOCYTES # BLD: 0.1 K/UL
IRON SATN MFR SERPL: 10 % (ref 15–50)
IRON SERPL-MCNC: 47 UG/DL (ref 37–145)
LYMPHOCYTES # BLD: 1.8 K/UL (ref 1.1–4.5)
LYMPHOCYTES NFR BLD: 34.9 % (ref 20–40)
MCH RBC QN AUTO: 29.7 PG (ref 27–31)
MCHC RBC AUTO-ENTMCNC: 32.7 G/DL (ref 33–37)
MCV RBC AUTO: 90.7 FL (ref 81–99)
MONOCYTES # BLD: 0.4 K/UL (ref 0–0.9)
MONOCYTES NFR BLD: 8.1 % (ref 0–10)
NEUTROPHILS # BLD: 2.7 K/UL (ref 1.5–7.5)
NEUTS SEG NFR BLD: 51.5 % (ref 50–65)
PLATELET # BLD AUTO: 246 K/UL (ref 130–400)
PMV BLD AUTO: 9.6 FL (ref 9.4–12.3)
RBC # BLD AUTO: 4.28 M/UL (ref 4.2–5.4)
RETICS # AUTO: 0.05 M/UL (ref 0.03–0.12)
RETICS/RBC NFR: 1.25 % (ref 0.5–1.5)
TIBC SERPL-MCNC: 485 UG/DL (ref 250–400)
TSH SERPL DL<=0.005 MIU/L-ACNC: 3.59 UIU/ML (ref 0.27–4.2)
VIT B12 SERPL-MCNC: 573 PG/ML (ref 232–1245)
WBC # BLD AUTO: 5.2 K/UL (ref 4.8–10.8)

## 2025-02-14 LAB — TRANSFERRIN SERPL-MCNC: 394 MG/DL (ref 200–360)

## 2025-02-16 RX ORDER — FERROUS SULFATE 325(65) MG
TABLET ORAL
Qty: 30 TABLET | Refills: 2 | Status: SHIPPED | OUTPATIENT
Start: 2025-02-16

## 2025-04-15 SDOH — ECONOMIC STABILITY: FOOD INSECURITY: WITHIN THE PAST 12 MONTHS, YOU WORRIED THAT YOUR FOOD WOULD RUN OUT BEFORE YOU GOT MONEY TO BUY MORE.: NEVER TRUE

## 2025-04-15 SDOH — ECONOMIC STABILITY: INCOME INSECURITY: IN THE LAST 12 MONTHS, WAS THERE A TIME WHEN YOU WERE NOT ABLE TO PAY THE MORTGAGE OR RENT ON TIME?: NO

## 2025-04-15 SDOH — ECONOMIC STABILITY: FOOD INSECURITY: WITHIN THE PAST 12 MONTHS, THE FOOD YOU BOUGHT JUST DIDN'T LAST AND YOU DIDN'T HAVE MONEY TO GET MORE.: NEVER TRUE

## 2025-04-15 ASSESSMENT — PATIENT HEALTH QUESTIONNAIRE - PHQ9
SUM OF ALL RESPONSES TO PHQ QUESTIONS 1-9: 0
2. FEELING DOWN, DEPRESSED OR HOPELESS: NOT AT ALL
2. FEELING DOWN, DEPRESSED OR HOPELESS: NOT AT ALL
SUM OF ALL RESPONSES TO PHQ9 QUESTIONS 1 & 2: 0
SUM OF ALL RESPONSES TO PHQ QUESTIONS 1-9: 0
1. LITTLE INTEREST OR PLEASURE IN DOING THINGS: NOT AT ALL
1. LITTLE INTEREST OR PLEASURE IN DOING THINGS: NOT AT ALL
SUM OF ALL RESPONSES TO PHQ QUESTIONS 1-9: 0
SUM OF ALL RESPONSES TO PHQ QUESTIONS 1-9: 0

## 2025-04-16 ENCOUNTER — OFFICE VISIT (OUTPATIENT)
Age: 65
End: 2025-04-16
Payer: MEDICARE

## 2025-04-16 VITALS
BODY MASS INDEX: 25.24 KG/M2 | DIASTOLIC BLOOD PRESSURE: 64 MMHG | TEMPERATURE: 97.6 F | OXYGEN SATURATION: 96 % | SYSTOLIC BLOOD PRESSURE: 110 MMHG | HEIGHT: 59 IN | HEART RATE: 69 BPM | RESPIRATION RATE: 16 BRPM | WEIGHT: 125.2 LBS

## 2025-04-16 DIAGNOSIS — D50.9 IRON DEFICIENCY ANEMIA, UNSPECIFIED IRON DEFICIENCY ANEMIA TYPE: Primary | ICD-10-CM

## 2025-04-16 DIAGNOSIS — D50.9 IRON DEFICIENCY ANEMIA, UNSPECIFIED IRON DEFICIENCY ANEMIA TYPE: ICD-10-CM

## 2025-04-16 LAB
BASOPHILS # BLD: 0.1 K/UL (ref 0–0.2)
BASOPHILS NFR BLD: 0.9 % (ref 0–1)
EOSINOPHIL # BLD: 0.1 K/UL (ref 0–0.6)
EOSINOPHIL NFR BLD: 2.4 % (ref 0–5)
ERYTHROCYTE [DISTWIDTH] IN BLOOD BY AUTOMATED COUNT: 13.6 % (ref 11.5–14.5)
FERRITIN SERPL-MCNC: 26.3 NG/ML (ref 13–150)
HCT VFR BLD AUTO: 42.3 % (ref 37–47)
HGB BLD-MCNC: 13.6 G/DL (ref 12–16)
IMM GRANULOCYTES # BLD: 0 K/UL
IRON SERPL-MCNC: 82 UG/DL (ref 37–145)
LYMPHOCYTES # BLD: 1.7 K/UL (ref 1.1–4.5)
LYMPHOCYTES NFR BLD: 30.9 % (ref 20–40)
MCH RBC QN AUTO: 28.5 PG (ref 27–31)
MCHC RBC AUTO-ENTMCNC: 32.2 G/DL (ref 33–37)
MCV RBC AUTO: 88.7 FL (ref 81–99)
MONOCYTES # BLD: 0.4 K/UL (ref 0–0.9)
MONOCYTES NFR BLD: 6.5 % (ref 0–10)
NEUTROPHILS # BLD: 3.2 K/UL (ref 1.5–7.5)
NEUTS SEG NFR BLD: 59.1 % (ref 50–65)
PLATELET # BLD AUTO: 215 K/UL (ref 130–400)
PMV BLD AUTO: 10.1 FL (ref 9.4–12.3)
RBC # BLD AUTO: 4.77 M/UL (ref 4.2–5.4)
RETICS # AUTO: 0.03 M/UL (ref 0.03–0.12)
RETICS/RBC NFR: 0.64 % (ref 0.5–1.5)
WBC # BLD AUTO: 5.4 K/UL (ref 4.8–10.8)

## 2025-04-16 PROCEDURE — 1090F PRES/ABSN URINE INCON ASSESS: CPT | Performed by: NURSE PRACTITIONER

## 2025-04-16 PROCEDURE — G8419 CALC BMI OUT NRM PARAM NOF/U: HCPCS | Performed by: NURSE PRACTITIONER

## 2025-04-16 PROCEDURE — G8427 DOCREV CUR MEDS BY ELIG CLIN: HCPCS | Performed by: NURSE PRACTITIONER

## 2025-04-16 PROCEDURE — 99213 OFFICE O/P EST LOW 20 MIN: CPT | Performed by: NURSE PRACTITIONER

## 2025-04-16 PROCEDURE — G8399 PT W/DXA RESULTS DOCUMENT: HCPCS | Performed by: NURSE PRACTITIONER

## 2025-04-16 PROCEDURE — 1123F ACP DISCUSS/DSCN MKR DOCD: CPT | Performed by: NURSE PRACTITIONER

## 2025-04-16 SDOH — ECONOMIC STABILITY: FOOD INSECURITY: WITHIN THE PAST 12 MONTHS, THE FOOD YOU BOUGHT JUST DIDN'T LAST AND YOU DIDN'T HAVE MONEY TO GET MORE.: NEVER TRUE

## 2025-04-16 SDOH — ECONOMIC STABILITY: FOOD INSECURITY: WITHIN THE PAST 12 MONTHS, YOU WORRIED THAT YOUR FOOD WOULD RUN OUT BEFORE YOU GOT MONEY TO BUY MORE.: NEVER TRUE

## 2025-04-16 ASSESSMENT — ENCOUNTER SYMPTOMS
RESPIRATORY NEGATIVE: 1
BLOOD IN STOOL: 1

## 2025-04-16 NOTE — PROGRESS NOTES
[facility], [facility]NAL MEDICINE AND PEDIATR[facility]  [address]  [place] [address]       SUBJECTIVE:    Patient ID: Sujey Watson is a64 y.o. female.  Sujey Watson is here today for 6 Month Follow-Up (Patient is here today for her 6 month visit. Patient is not fasting today. )  .    HPI:   History of Present Illness  The patient presents for evaluation of findings of iron deficiency anemia.  She does report that she took her iron supplementation x 1 month but did not get the additional refills and that would mean she has now been off of it x 1 month.    Blood Loss Through Stools  - Intermittent blood loss through stools is reported, with no significant change in the volume of blood loss.  - The frequency of this symptom varies, with periods of 2 to 3 days of heavy bleeding, followed by 2 to 3 weeks of no bleeding, and occasionally minimal bleeding.  - Consumption of a significant amount of beets prior to the last blood work may have contributed to the observed blood loss she has concerns.  - Suppositories are used as a treatment measure, typically resulting in cessation of bleeding within a 3-week period.  - However, the bleeding may recur after this period, presenting as minimal bleeding initially, then escalating to severe bleeding over a 5-day period, before subsiding for approximately 6 weeks.  - Rubber band ligation procedures have been performed in Dr. Monreal's office when bleeding became unmanageable.  - Has been evaluated by gastroenterology and there was no concern with her colon and the longstanding history of intermittent bleeding that has been noted by patient.  She is not due for colonoscopy again until 2027.    Anemia  - A history of anemia in teenage years is associated with severe endometriosis and hemorrhage, necessitating a hysterectomy at the age of 22.  - No subsequent episodes of anemia are recalled, but intermittent blood loss through stool has

## 2025-04-19 LAB — TRANSFERRIN SERPL-MCNC: 379 MG/DL (ref 200–360)

## 2025-04-24 ENCOUNTER — RESULTS FOLLOW-UP (OUTPATIENT)
Age: 65
End: 2025-04-24

## (undated) DEVICE — TBG SMPL FLTR LINE NASL 02/C02 A/ BX/100

## (undated) DEVICE — Device: Brand: DEFENDO AIR/WATER/SUCTION AND BIOPSY VALVE

## (undated) DEVICE — THE SINGLE USE ETRAP – POLYP TRAP IS USED FOR SUCTION RETRIEVAL OF ENDOSCOPICALLY REMOVED POLYPS.: Brand: ETRAP

## (undated) DEVICE — ENDOGATOR AUXILIARY WATER JET CONNECTOR: Brand: ENDOGATOR

## (undated) DEVICE — THE CHANNEL CLEANING BRUSH IS A NYLON FLEXI BRUSH ATTACHED TO A FLEXIBLE PLASTIC SHEATH DESIGNED TO SAFELY REMOVE DEBRIS FROM FLEXIBLE ENDOSCOPES.

## (undated) DEVICE — MSK O2 MD CONCENTR A/ LF 7FT 1P/U

## (undated) DEVICE — YANKAUER,BULB TIP WITH VENT: Brand: ARGYLE

## (undated) DEVICE — SNAR POLYP CAPTIVATOR MICROHEX 13 240CM

## (undated) DEVICE — MASK,OXYGEN,MED CONC,ADLT,7' TUB, UC: Brand: PENDING

## (undated) DEVICE — SENSR O2 OXIMAX FNGR A/ 18IN NONSTR

## (undated) DEVICE — FRCP BX RADJAW4 NDL 2.8 240 STD OG